# Patient Record
Sex: MALE | Employment: OTHER | URBAN - METROPOLITAN AREA
[De-identification: names, ages, dates, MRNs, and addresses within clinical notes are randomized per-mention and may not be internally consistent; named-entity substitution may affect disease eponyms.]

---

## 2018-03-08 ENCOUNTER — OFFICE VISIT (OUTPATIENT)
Dept: OPHTHALMOLOGY | Facility: CLINIC | Age: 83
End: 2018-03-08
Attending: OPHTHALMOLOGY
Payer: MEDICARE

## 2018-03-08 DIAGNOSIS — H34.8192 CRVO (CENTRAL RETINAL VEIN OCCLUSION) (H): ICD-10-CM

## 2018-03-08 DIAGNOSIS — H40.1130 PRIMARY OPEN ANGLE GLAUCOMA OF BOTH EYES, UNSPECIFIED GLAUCOMA STAGE: Primary | ICD-10-CM

## 2018-03-08 DIAGNOSIS — Z96.1 PSEUDOPHAKIA OF BOTH EYES: ICD-10-CM

## 2018-03-08 DIAGNOSIS — T81.31XA SURGICAL WOUND DEHISCENCE, INITIAL ENCOUNTER: ICD-10-CM

## 2018-03-08 DIAGNOSIS — H35.30 AMD (AGE-RELATED MACULAR DEGENERATION), BILATERAL: ICD-10-CM

## 2018-03-08 DIAGNOSIS — H40.1132 PRIMARY OPEN ANGLE GLAUCOMA OF BOTH EYES, MODERATE STAGE: ICD-10-CM

## 2018-03-08 PROCEDURE — G0463 HOSPITAL OUTPT CLINIC VISIT: HCPCS | Mod: ZF

## 2018-03-08 PROCEDURE — 92020 GONIOSCOPY: CPT | Mod: ZF | Performed by: OPHTHALMOLOGY

## 2018-03-08 PROCEDURE — 92133 CPTRZD OPH DX IMG PST SGM ON: CPT | Mod: ZF | Performed by: OPHTHALMOLOGY

## 2018-03-08 RX ORDER — CIPROFLOXACIN HYDROCHLORIDE 3.5 MG/ML
1 SOLUTION/ DROPS TOPICAL 4 TIMES DAILY
Qty: 1 BOTTLE | Refills: 0 | Status: SHIPPED | OUTPATIENT
Start: 2018-03-08

## 2018-03-08 RX ORDER — BRINZOLAMIDE 10 MG/ML
1 SUSPENSION/ DROPS OPHTHALMIC 2 TIMES DAILY
COMMUNITY
End: 2018-10-19

## 2018-03-08 RX ORDER — TAMSULOSIN HYDROCHLORIDE 0.4 MG/1
CAPSULE ORAL DAILY
COMMUNITY

## 2018-03-08 RX ORDER — TIMOLOL MALEATE 5 MG/ML
1 SOLUTION OPHTHALMIC EVERY MORNING
COMMUNITY
End: 2018-10-19

## 2018-03-08 RX ORDER — BRIMONIDINE TARTRATE 1 MG/ML
1 SOLUTION/ DROPS OPHTHALMIC 2 TIMES DAILY
COMMUNITY
End: 2018-10-19

## 2018-03-08 ASSESSMENT — GONIOSCOPY
OS_NASAL: 3+
OD_INFERIOR: 3+
OS_INFERIOR: 3+
OD_TEMPORAL: 3+
OS_TEMPORAL: 3+
OD_SUPERIOR: 3+
OS_SUPERIOR: 3+
OD_NASAL: 3+

## 2018-03-08 ASSESSMENT — PACHYMETRY
OD_CT(UM): 573
OS_CT(UM): 586

## 2018-03-08 ASSESSMENT — EXTERNAL EXAM - LEFT EYE: OS_EXAM: NORMAL

## 2018-03-08 ASSESSMENT — REFRACTION_WEARINGRX
OD_AXIS: 132
OD_SPHERE: -1.00
OD_ADD: +2.50
OS_ADD: +2.50
OS_CYLINDER: +0.00
OS_SPHERE: -0.50
OS_AXIS: 000
OD_CYLINDER: +0.50

## 2018-03-08 ASSESSMENT — VISUAL ACUITY
CORRECTION_TYPE: GLASSES
OS_CC+: -1
METHOD: SNELLEN - LINEAR
OS_CC: 20/100
OD_CC+: -1
OD_CC: 20/30

## 2018-03-08 ASSESSMENT — TONOMETRY
OS_IOP_MMHG: 21
IOP_METHOD: APPLANATION
OD_IOP_MMHG: 22

## 2018-03-08 ASSESSMENT — CONF VISUAL FIELD
OS_INFERIOR_NASAL_RESTRICTION: 3
OD_NORMAL: 1

## 2018-03-08 ASSESSMENT — SLIT LAMP EXAM - LIDS
COMMENTS: NORMAL
COMMENTS: NORMAL

## 2018-03-08 ASSESSMENT — CUP TO DISC RATIO
OD_RATIO: 0.8
OS_RATIO: 0.9

## 2018-03-08 ASSESSMENT — EXTERNAL EXAM - RIGHT EYE: OD_EXAM: NORMAL

## 2018-03-08 NOTE — MR AVS SNAPSHOT
After Visit Summary   3/8/2018    Jerome Schoenborn    MRN: [id number]           Patient Information     Date Of Birth          1935        Visit Information        Provider Department      3/8/2018 12:30 PM Lu Ray MD [facility]        Today's Diagnoses     Primary open angle glaucoma of both eyes, unspecified glaucoma stage    -  1    Primary open angle glaucoma of both eyes, moderate stage        Surgical wound dehiscence, initial encounter        Pseudophakia of both eyes        AMD (age-related macular degeneration), bilateral        CRVO (central retinal vein occlusion)          Care Instructions    Will need to obtain old notes, op notes, and visual field tests.  Patient will continue on Azopt (brinzolamide) which is an orange top drop 2x/day (12 hours apart) in  BOTH EYES, Timolol which is a yellow top drop in the morning in BOTH EYES, Alphagan (Brimonidine) which is a purple top drop 2x/day (12 hours apart) in BOTH EYES, Lumigan which is a teal top drop at bedtime in BOTH EYES.  Patient will continue on .CIprofloxacin which is a tan top 4x/day in the LEFT EYE ONLY.    A long discussion of the risks, benefits, and alternatives including potential treatment and management options were had with patient and a decision was made to proceed with tube repositioning and recovering surgery in the left eye.          Follow-ups after your visit        Your next 10 appointments already scheduled     Mar 22, 2018 12:30 PM CDT   Post-Op with Lu Ray MD   Eye Clinic ([facility])    Niraj Guy [address] [facility] [address] [place] [id number]   [phone]            Mar 29, 2018 12:45 PM CDT   Post-Op with Lu Ray MD   Eye Clinic ([facility])    Niraj Guy [address] [address]   [phone]              Who to contact     Please call your clinic at [phone]  to:    Ask questions about your health    Make or cancel appointments    Discuss your medicines    Learn about your test results    Speak to your doctor            Additional Information About Your Visit        [employer] Information     [employer] is an electronic gateway that provides easy, online access to your medical records. With [employer], you can request a clinic appointment, read your test results, renew a prescription or communicate with your care team.     To sign up for [employer] visit the website at [email or web address]   You will be asked to enter the access code listed below, as well as some personal information. Please follow the directions to create your username and password.     Your access code is: [email or web address]  Expires: 2018  6:30 AM     Your access code will  in 90 days. If you need help or a new code, please contact your [facility] or call [phone] for assistance.        Care EveryWhere ID     This is your Care EveryWhere ID. This could be used by other organizations to access your [place] medical records  [id number]         Blood Pressure from Last 3 Encounters:   No data found for BP    Weight from Last 3 Encounters:   No data found for Wt              We Performed the Following     GONIOSCOPY     OCT Optic Nerve RNFL Spectralis OU (both eyes)     Claudia-Operative Worksheet (Glaucoma)          Today's Medication Changes          These changes are accurate as of 3/8/18  2:40 PM.  If you have any questions, ask your nurse or doctor.               Start taking these medicines.        Dose/Directions    ciprofloxacin 0.3 % ophthalmic solution   Commonly known as:  CILOXAN   Used for:  Surgical wound dehiscence, initial encounter   Started by:  Lu Ray MD        Dose:  1 drop   Place 1 drop into the right eye 4 times daily   Quantity:  1 Bottle   Refills:  0            Where to get your medicines      Some of these will need a paper  prescription and others can be bought over the counter.  Ask your nurse if you have questions.     Bring a paper prescription for each of these medications     ciprofloxacin 0.3 % ophthalmic solution                Primary Care Provider Office Phone # Fax #    Brenda MART MD Elisabeth [phone]       [facility] [address]  [facility] [address]        Equal Access to Services     [facility] : Hadii aad ku hadasho Soomaali, waaxda luqadaha, qaybta kaalmada adeegyada, waxay yehuda hayaan adeirlanda chancejacobomisty lastacey . So [facility] [phone].    ATENCIÓN: Si habla español, tiene a lawson disposición servicios gratuitos de asistencia lingüística. Iggy al [phone].    We comply with applicable federal civil rights laws and [place] laws. We do not discriminate on the basis of race, color, national origin, age, disability, sex, sexual orientation, or gender identity.            Thank you!     Thank you for choosing EYE CLINIC  for your care. Our goal is always to provide you with excellent care. Hearing back from our patients is one way we can continue to improve our services. Please take a few minutes to complete the written survey that you may receive in the mail after your visit with us. Thank you!             Your Updated Medication List - Protect others around you: Learn how to safely use, store and throw away your medicines at www.disposemymeds.org.          This list is accurate as of 3/8/18  2:40 PM.  Always use your most recent med list.                   Brand Name Dispense Instructions for use Diagnosis    brimonidine 0.1 % ophthalmic solution    ALPHAGAN P     1 drop 3 times daily Uses BID BE    Primary open angle glaucoma of both eyes, unspecified glaucoma stage       brinzolamide 1 % ophthalmic susp    AZOPT     1 drop 2 times daily Uses BID BE    Primary open angle glaucoma of both eyes, unspecified glaucoma stage       ciprofloxacin 0.3 % ophthalmic solution    CILOXAN    1 Bottle     Place 1 drop into the right eye 4 times daily    Surgical wound dehiscence, initial encounter       FOLIC ACID PO      Take 1 mg by mouth daily    Primary open angle glaucoma of both eyes, unspecified glaucoma stage       METHOTREXATE PO       Primary open angle glaucoma of both eyes, unspecified glaucoma stage       tamsulosin 0.4 MG capsule    FLOMAX     Take by mouth daily    Primary open angle glaucoma of both eyes, unspecified glaucoma stage       timolol 0.5 % ophthalmic gel-form    TIMOPTIC-XE     1 drop every morning Uses BID BE    Primary open angle glaucoma of both eyes, unspecified glaucoma stage

## 2018-03-08 NOTE — PROGRESS NOTES
1)POAG -- s/p Tube revision OS in 2017 by Dr. Vizcaino, s/p Tube OS in 2013 by Dr. Vizcaino, s/p LTP OU in past per patient,  first started on gtts in 1980s  -- K pachy: 573/586   Tmax: 30s    HVF:      CDR: 0.8/0.9    HRT/OCT: OD:RNFL WNL and Mild RNFL thinning (poor tracing)      FHX of Glc: Brother, father, children on gtts     Gonio: open       Intolerant to:      Asthma/COPD: No  Steroid Use: No    Kidney Stones: No    Sulfa Allergy:  No    IOP targets:  2)PCIOL OU -- following with Dr. Vizcaino in [place], [place]  3)??H/O CRVO OS with ME s/p laser  -- following with Dr. 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 Dee  -- likely etiology of vision loss  5)Tube exposure OS -- rec surgery to repair with additional visiongaft     Will need to obtain old notes, op notes, and visual field tests.  Patient will continue on Azopt (brinzolamide) which is an orange top drop 2x/day (12 hours apart) in  BOTH EYES, Timolol which is a yellow top drop in the morning in BOTH EYES, Alphagan (Brimonidine) which is a purple top drop 2x/day (12 hours apart) in BOTH EYES, Lumigan which is a teal top drop at bedtime in BOTH EYES.  Patient will continue on .CIprofloxacin which is a tan top 4x/day in the LEFT EYE ONLY.    A long discussion of the risks, benefits, and alternatives including potential treatment and management options were had with patient and a decision was made to proceed with tube repositioning and recovering surgery in the left eye.            1. Primary open angle glaucoma (POAG) OS > OD s/p GDD (2013 Carrillo) left eye now exposed K pachy:  573/586  Tmax:  30s (normally 14-18)   HVF:      CDR:  0.4/0.8   HRT/OCT:       FHX of Glc:  Brother, Father, Children (no surgery/blindness)    Gonio:       Intolerant to:  none    Asthma/COPD:  no Steroid Use:  None current. Kidney Stones:   no  Sulfa Allergy:     no IOP targets: teens    2. Pseudophakia both eyes     3. Central retinal vein  occlusion both eyes . Receives q8 week injections by Dr Dee    4. Macular degeneration both eyes     HPI:  Reports he has been on glaucoma drops for 45 years.  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    Adam Osei MD  PGY3     Attending Physician Attestation:  Complete documentation of historical and exam elements from today's encounter can be found in the full encounter summary report (not reduplicated in this progress note). I personally obtained the chief complaint(s) and history of present illness.  I confirmed and edited as necessary the review of systems, past medical/surgical history, family history, social history, and examination findings as documented by others; and I examined the patient myself. I personally reviewed the relevant tests, images, and reports as documented above. I formulated and edited as necessary the assessment and plan and discussed the findings and management plan with the patient and family.  - Lu Ray MD

## 2018-03-08 NOTE — PATIENT INSTRUCTIONS
Will need to obtain old notes, op notes, and visual field tests.  Patient will continue on Azopt (brinzolamide) which is an orange top drop 2x/day (12 hours apart) in  BOTH EYES, Timolol which is a yellow top drop in the morning in BOTH EYES, Alphagan (Brimonidine) which is a purple top drop 2x/day (12 hours apart) in BOTH EYES, Lumigan which is a teal top drop at bedtime in BOTH EYES.  Patient will continue on .CIprofloxacin which is a tan top 4x/day in the LEFT EYE ONLY.    A long discussion of the risks, benefits, and alternatives including potential treatment and management options were had with patient and a decision was made to proceed with tube repositioning and recovering surgery in the left eye.

## 2018-03-08 NOTE — LETTER
March 8, 2018      Lance Vizcaino MD  [facility]  [address] [place] [address]  Fax: [phone]         RE: Jerome Schoenborn  [address]  [place] [address]       Dear Dr. Vizcaino:     Thank you for referring your patient, Jerome Schoenborn, to the EYE CLINIC at [facility]. Please see a copy of my visit note below.    Assessment:   1)POAG -- s/p Tube revision OS in 2017 by Dr. Vizcaino, s/p Tube OS in 2013 by Dr. Vizcaino, s/p LTP OU in past per patient,  first started on gtts in 1980s  K pachy: 573/586  Tmax: 30s  CDR: 0.8/0.9    HRT/OCT: OD:RNFL WNL and Mild RNFL thinning (poor tracing)  FHX of Glc: Brother, father, children on gtts  Gonio: open  Asthma/COPD: No  Steroid Use: No  Kidney Stones: No  Sulfa Allergy: No  2)PCIOL OU -- following with Dr. Vizcaino in [place], [place]  3)??H/O CRVO OS with ME s/p laser  -- following with Dr. 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 Dee  -- likely etiology of vision loss  5)Tube exposure OD -- rec surgery to repair with additional visiongaft       Plan:  Will need to obtain old notes, op notes, and visual field tests.  Patient will continue on Azopt (brinzolamide) which is an orange top drop 2x/day (12 hours apart) in  BOTH EYES, Timolol which is a yellow top drop in the morning in BOTH EYES, Alphagan (Brimonidine) which is a purple top drop 2x/day (12 hours apart) in BOTH EYES, Lumigan which is a teal top drop at bedtime in BOTH EYES.  Patient will continue on .Ciprofloxacin which is a tan top 4x/day in the LEFT EYE ONLY.    A long discussion of the risks, benefits, and alternatives including potential treatment and management options were had with patient and a decision was made to proceed with tube repositioning and recovering surgery in the left eye.    Tube recover left eye   continue present management with glaucoma drops  Continue antibiotic  ointment.      Again, thank you for allowing me to participate in the care of your patient.        Sincerely,      Lu Ray MD

## 2018-03-19 ENCOUNTER — ANESTHESIA EVENT (OUTPATIENT)
Dept: SURGERY | Facility: AMBULATORY SURGERY CENTER | Age: 83
End: 2018-03-19

## 2018-03-21 ENCOUNTER — ANESTHESIA (OUTPATIENT)
Dept: SURGERY | Facility: AMBULATORY SURGERY CENTER | Age: 83
End: 2018-03-21

## 2018-03-21 ENCOUNTER — HOSPITAL ENCOUNTER (OUTPATIENT)
Facility: AMBULATORY SURGERY CENTER | Age: 83
End: 2018-03-21
Attending: OPHTHALMOLOGY
Payer: COMMERCIAL

## 2018-03-21 ENCOUNTER — SURGERY (OUTPATIENT)
Age: 83
End: 2018-03-21

## 2018-03-21 VITALS
HEIGHT: 68 IN | OXYGEN SATURATION: 98 % | DIASTOLIC BLOOD PRESSURE: 77 MMHG | WEIGHT: 175 LBS | RESPIRATION RATE: 16 BRPM | BODY MASS INDEX: 26.52 KG/M2 | SYSTOLIC BLOOD PRESSURE: 135 MMHG | TEMPERATURE: 97.5 F | HEART RATE: 67 BPM

## 2018-03-21 DIAGNOSIS — H40.1132 PRIMARY OPEN ANGLE GLAUCOMA OF BOTH EYES, MODERATE STAGE: Primary | ICD-10-CM

## 2018-03-21 RX ORDER — SODIUM CHLORIDE, SODIUM LACTATE, POTASSIUM CHLORIDE, CALCIUM CHLORIDE 600; 310; 30; 20 MG/100ML; MG/100ML; MG/100ML; MG/100ML
INJECTION, SOLUTION INTRAVENOUS CONTINUOUS
Status: DISCONTINUED | OUTPATIENT
Start: 2018-03-21 | End: 2018-03-22 | Stop reason: HOSPADM

## 2018-03-21 RX ORDER — NALOXONE HYDROCHLORIDE 0.4 MG/ML
.1-.4 INJECTION, SOLUTION INTRAMUSCULAR; INTRAVENOUS; SUBCUTANEOUS
Status: DISCONTINUED | OUTPATIENT
Start: 2018-03-21 | End: 2018-03-22 | Stop reason: HOSPADM

## 2018-03-21 RX ORDER — MEPERIDINE HYDROCHLORIDE 25 MG/ML
12.5 INJECTION INTRAMUSCULAR; INTRAVENOUS; SUBCUTANEOUS
Status: DISCONTINUED | OUTPATIENT
Start: 2018-03-21 | End: 2018-03-22 | Stop reason: HOSPADM

## 2018-03-21 RX ORDER — ACETAMINOPHEN 325 MG/1
975 TABLET ORAL ONCE
Status: COMPLETED | OUTPATIENT
Start: 2018-03-21 | End: 2018-03-21

## 2018-03-21 RX ORDER — ONDANSETRON 2 MG/ML
4 INJECTION INTRAMUSCULAR; INTRAVENOUS EVERY 30 MIN PRN
Status: DISCONTINUED | OUTPATIENT
Start: 2018-03-21 | End: 2018-03-22 | Stop reason: HOSPADM

## 2018-03-21 RX ORDER — BALANCED SALT SOLUTION 6.4; .75; .48; .3; 3.9; 1.7 MG/ML; MG/ML; MG/ML; MG/ML; MG/ML; MG/ML
SOLUTION OPHTHALMIC PRN
Status: DISCONTINUED | OUTPATIENT
Start: 2018-03-21 | End: 2018-03-21 | Stop reason: HOSPADM

## 2018-03-21 RX ORDER — ONDANSETRON 4 MG/1
4 TABLET, ORALLY DISINTEGRATING ORAL EVERY 30 MIN PRN
Status: DISCONTINUED | OUTPATIENT
Start: 2018-03-21 | End: 2018-03-22 | Stop reason: HOSPADM

## 2018-03-21 RX ORDER — LIDOCAINE 40 MG/G
CREAM TOPICAL
Status: DISCONTINUED | OUTPATIENT
Start: 2018-03-21 | End: 2018-03-21 | Stop reason: HOSPADM

## 2018-03-21 RX ORDER — OXYCODONE HYDROCHLORIDE 5 MG/1
5 TABLET ORAL EVERY 4 HOURS PRN
Status: DISCONTINUED | OUTPATIENT
Start: 2018-03-21 | End: 2018-03-22 | Stop reason: HOSPADM

## 2018-03-21 RX ORDER — ONDANSETRON 2 MG/ML
INJECTION INTRAMUSCULAR; INTRAVENOUS PRN
Status: DISCONTINUED | OUTPATIENT
Start: 2018-03-21 | End: 2018-03-21

## 2018-03-21 RX ORDER — SODIUM CHLORIDE, SODIUM LACTATE, POTASSIUM CHLORIDE, CALCIUM CHLORIDE 600; 310; 30; 20 MG/100ML; MG/100ML; MG/100ML; MG/100ML
INJECTION, SOLUTION INTRAVENOUS CONTINUOUS
Status: DISCONTINUED | OUTPATIENT
Start: 2018-03-21 | End: 2018-03-21 | Stop reason: HOSPADM

## 2018-03-21 RX ORDER — DEXAMETHASONE SODIUM PHOSPHATE 4 MG/ML
INJECTION, SOLUTION INTRA-ARTICULAR; INTRALESIONAL; INTRAMUSCULAR; INTRAVENOUS; SOFT TISSUE PRN
Status: DISCONTINUED | OUTPATIENT
Start: 2018-03-21 | End: 2018-03-21 | Stop reason: HOSPADM

## 2018-03-21 RX ORDER — FENTANYL CITRATE 50 UG/ML
25-50 INJECTION, SOLUTION INTRAMUSCULAR; INTRAVENOUS
Status: DISCONTINUED | OUTPATIENT
Start: 2018-03-21 | End: 2018-03-21 | Stop reason: HOSPADM

## 2018-03-21 RX ORDER — NEOMYCIN SULFATE, POLYMYXIN B SULFATE, AND DEXAMETHASONE 3.5; 10000; 1 MG/G; [USP'U]/G; MG/G
OINTMENT OPHTHALMIC PRN
Status: DISCONTINUED | OUTPATIENT
Start: 2018-03-21 | End: 2018-03-21 | Stop reason: HOSPADM

## 2018-03-21 RX ORDER — PROPOFOL 10 MG/ML
INJECTION, EMULSION INTRAVENOUS PRN
Status: DISCONTINUED | OUTPATIENT
Start: 2018-03-21 | End: 2018-03-21

## 2018-03-21 RX ADMIN — PROPOFOL 40 MG: 10 INJECTION, EMULSION INTRAVENOUS at 10:03

## 2018-03-21 RX ADMIN — SODIUM CHLORIDE, SODIUM LACTATE, POTASSIUM CHLORIDE, CALCIUM CHLORIDE: 600; 310; 30; 20 INJECTION, SOLUTION INTRAVENOUS at 09:58

## 2018-03-21 RX ADMIN — Medication 0.5 ML: at 11:17

## 2018-03-21 RX ADMIN — ONDANSETRON 4 MG: 2 INJECTION INTRAMUSCULAR; INTRAVENOUS at 09:58

## 2018-03-21 RX ADMIN — BALANCED SALT SOLUTION 15 ML: 6.4; .75; .48; .3; 3.9; 1.7 SOLUTION OPHTHALMIC at 10:13

## 2018-03-21 RX ADMIN — ACETAMINOPHEN 975 MG: 325 TABLET ORAL at 09:21

## 2018-03-21 RX ADMIN — NEOMYCIN SULFATE, POLYMYXIN B SULFATE, AND DEXAMETHASONE 2 INCH: 3.5; 10000; 1 OINTMENT OPHTHALMIC at 11:17

## 2018-03-21 NOTE — IP AVS SNAPSHOT
MRN:[id number]                      After Visit Summary   3/21/2018    Jerome Schoenborn    MRN: [id number]           Thank you!     Thank you for choosing [facility] for your care. Our goal is always to provide you with excellent care. Hearing back from our patients is one way we can continue to improve our services. Please take a few minutes to complete the written survey that you may receive in the mail after you visit with us. Thank you!        Patient Information     Date Of Birth          1935        About your hospital stay     You were admitted on:  March 21, 2018 [facility]    You were discharged on:  March 21, 2018       Who to Call     For medical emergencies, please call 911.  For non-urgent questions about your medical care, please call your primary care provider or clinic, [phone]  For questions related to your surgery, please call your surgery clinic        Attending Provider     Provider Specialty    Lu Ray MD Ophthalmology       Primary Care Provider Office Phone # Fax #    Brenda BRITTNY Barber [phone]      After Care Instructions     Eye drops as prescribed by physician.  Start drops today unless told otherwise by the physician           May use Tylenol or Advil for pain as directed by the physician           Notify Physician if you have severe headache or nausea           Remove patch per physician instruction           Return to clinic as instructed by physician           Wear eye shield or patch as directed                 Your next 10 appointments already scheduled     Mar 22, 2018 12:30 PM CDT   Post-Op with Lu Ray MD   Eye Clinic ([facility])    [address] [facility] [address] [place] [id number]   [phone]            Mar 29, 2018 12:45 PM CDT   Post-Op with Lu Ray MD   Eye Clinic ([facility])     "[address]  9[address] Clin [address]  [facility] [id number] [phone]              Further instructions from your care team       Discharge Instructions after Eye Surgery      Take your post-operative drops according to the instructions    Wear a shield at bedtime    For tube and cataract surgery wear your shield for one week    For trabeculectomy surgery wear your shield for two weeks    Proper placement of the shield: place the bump of the shield on the bridge of your nose, resting the shield on the orbital bones. Secure the shield with one piece of tape, from forehead to cheek.      Approved activities (OK to do the following):    Please clean around the eye(s) gently with tissue or washcloth    Leaning over the sink to brush your teeth    Going up and down stairs    Walking for exercise    Watching TV or reading    After your clinic appointment tomorrow, you may shower, including putting your head under the shower, making sure to close your eyes      Restricted activities:     No bending such as \"toe-touching\"    Keep head above level of heart    Sit down to put on shoes    No heavy lifting (10 pound restriction, equal to the weight of a gallon of milk)    Do not push or rub eye      Call for any problems or questions [phone]    There is always someone on call, even on weekends.        [facility]  Home Care Following Anesthesia  For 24 hours after surgery:  1. Get plenty of rest.  A responsible adult must stay with you for at least 24 hours after you leave the surgery center.  2. Do not drive or use heavy equipment.  If you have weakness or tingling, don't drive or use heavy equipment until this feeling goes away.   3. Do not drink alcohol.   4. Avoid strenuous or risky activities.  Ask for help when climbing stairs.  5. You may feel lightheaded.  IF so, sit for a few minutes before standing.  Have someone help you get up.   6. If " you have nausea (feel sick to your stomach): Drink only clear liquids such as apple juice, ginger ale, broth or 7-Up.  Rest may also help.  Be sure to drink enough fluids.  Move to a regular diet as you feel able.   7. You may have a slight fever.  Call the doctor if your fever is over 100 F (37.7 C) (taken under the tongue) or lasts longer than 24 hours.  8. You may have a dry mouth, a sore throat, muscle aches or trouble sleeping. These should go away after 24 hours.  9. Do not make important or legal decisions.               Tips for taking pain medications  To get the best pain relief possible, remember these points:    Take pain medications as directed, before pain becomes severe.    Pain medication can upset your stomach: taking it with food may help.    Constipation is a common side effect of pain medication. Drink plenty of  fluids.    Eat foods high in fiber. Take a stool softener if recommended by your doctor or pharmacist.    Do not drink alcohol, drive or operate machinery while taking pain medications.    Ask about other ways to control pain, such as with heat, ice or relaxation.    Tylenol/Acetaminophen Consumption  To help encourage the safe use of acetaminophen, the makers of TYLENOL  have lowered the maximum daily dose for single-ingredient Extra Strength TYLENOL  (acetaminophen) products sold in the [place] from 8 pills per day (4,000 mg) to 6 pills per day (3,000 mg). The dosing interval has also changed from 2 pills every 4-6 hours to 2 pills every 6 hours.    If you feel your pain relief is insufficient, you may take Tylenol/Acetaminophen in addition to your narcotic pain medication.     Be careful not to exceed 3,000 mg of Tylenol/Acetaminophen in a 24 hour period from all sources.    If you are taking extra strength Tylenol/acetaminophen (500 mg), the maximum dose is 6 tablets in 24 hours.    If you are taking regular strength acetaminophen (325 mg), the maximum dose is 9 tablets in 24  "hours.    Call a doctor for any of the followin. Signs of infection (fever, growing tenderness at the surgery site, a large amount of drainage or bleeding, severe pain, foul-smelling drainage, redness, swelling).  2. It has been over 8 to 10 hours since surgery and you are still not able to urinate (pass water).  3. Headache for over 24 hours.  4. Numbness, tingling or weakness the day after surgery (if you had spinal anesthesia).    Your doctor is:  Dr. Franky Mosley, Ophthalmology: [phone]  Or dial [phone] and ask for the resident on call for:  Ophthalmology  For emergency care, call the:  [place] [facility]:  [phone] (TTY for hearing impaired: [phone])                    Pending Results     No orders found from 3/19/2018 to 3/22/2018.            Admission Information     Date & Time Provider Department Dept. Phone    3/21/2018 Lu Ray MD [facility] [phone]      Your Vitals Were     Blood Pressure Pulse Temperature Respirations Height Weight    146/93 67 98.5  F (36.9  C) (Oral) 16 1.727 m (5' 8\") 79.4 kg (175 lb)    Pulse Oximetry BMI (Body Mass Index)                96% 26.61 kg/m2          MyChart Information     [employer] is an electronic gateway that provides easy, online access to your medical records. With [employer], you can request a clinic appointment, read your test results, renew a prescription or communicate with your care team.     To sign up for [employer] visit the website at [email or web address]   You will be asked to enter the access code listed below, as well as some personal information. Please follow the directions to create your username and password.     Your access code is: [email or web address]  Expires: 2018  7:30 AM     Your access code will  in 90 days. If you need help or a new code, please contact your [facility] or call [phone] for assistance.        Care " EveryWhere ID     This is your Care EveryWhere ID. This could be used by other organizations to access your [facility] medical records  [id number]        Equal Access to Services     DOMONIQUE CARRASQUILLO : Chris Rogers, joycelyn de leon, magnusjudi romanodorian krystleyasmin, christopher grahamin hayaacallie daltonirlanda nascimento xena doradoRich So [facility] [phone].    ATENCIÓN: Si habla español, tiene a lawson disposición servicios gratuitos de asistencia lingüística. Llame al [phone].    We comply with applicable federal civil rights laws and [place] laws. We do not discriminate on the basis of race, color, national origin, age, disability, sex, sexual orientation, or gender identity.               Review of your medicines      CONTINUE these medicines which have NOT CHANGED        Dose / Directions    brimonidine 0.1 % ophthalmic solution   Commonly known as:  ALPHAGAN P   Used for:  Primary open angle glaucoma of both eyes, unspecified glaucoma stage        Dose:  1 drop   1 drop 3 times daily Uses BID BE   Refills:  0       brinzolamide 1 % ophthalmic susp   Commonly known as:  AZOPT   Used for:  Primary open angle glaucoma of both eyes, unspecified glaucoma stage        Dose:  1 drop   1 drop 2 times daily Uses BID BE   Refills:  0       ciprofloxacin 0.3 % ophthalmic solution   Commonly known as:  CILOXAN   Used for:  Surgical wound dehiscence, initial encounter        Dose:  1 drop   Place 1 drop into the right eye 4 times daily   Quantity:  1 Bottle   Refills:  0       FOLIC ACID PO   Used for:  Primary open angle glaucoma of both eyes, unspecified glaucoma stage        Dose:  1 mg   Take 1 mg by mouth daily   Refills:  0       HYDROCHLOROTHIAZIDE PO        Dose:  25 mg   Take 25 mg by mouth daily   Refills:  0       METHOTREXATE PO   Used for:  Primary open angle glaucoma of both eyes, unspecified glaucoma stage        Take by mouth once a week 2.5 mg tablets takes 7 tablets on sunday's   Refills:  0       tamsulosin 0.4 MG capsule    Commonly known as:  FLOMAX   Used for:  Primary open angle glaucoma of both eyes, unspecified glaucoma stage        Take by mouth daily   Refills:  0       timolol 0.5 % ophthalmic gel-form   Commonly known as:  TIMOPTIC-XE   Used for:  Primary open angle glaucoma of both eyes, unspecified glaucoma stage        Dose:  1 drop   1 drop every morning Uses BID BE   Refills:  0                Protect others around you: Learn how to safely use, store and throw away your medicines at www.disposemymeds.org.             Medication List: This is a list of all your medications and when to take them. Check marks below indicate your daily home schedule. Keep this list as a reference.      Medications           Morning Afternoon Evening Bedtime As Needed    brimonidine 0.1 % ophthalmic solution   Commonly known as:  ALPHAGAN P   1 drop 3 times daily Uses BID BE                                brinzolamide 1 % ophthalmic susp   Commonly known as:  AZOPT   1 drop 2 times daily Uses BID BE                                ciprofloxacin 0.3 % ophthalmic solution   Commonly known as:  CILOXAN   Place 1 drop into the right eye 4 times daily                                FOLIC ACID PO   Take 1 mg by mouth daily                                HYDROCHLOROTHIAZIDE PO   Take 25 mg by mouth daily                                METHOTREXATE PO   Take by mouth once a week 2.5 mg tablets takes 7 tablets on sunday's                                tamsulosin 0.4 MG capsule   Commonly known as:  FLOMAX   Take by mouth daily                                timolol 0.5 % ophthalmic gel-form   Commonly known as:  TIMOPTIC-XE   1 drop every morning Uses BID BE

## 2018-03-21 NOTE — OP NOTE
Pre-operative diagnosis: Left Eye Glaucoma with Tube Exposure   Post-operative diagnosis Same   Procedure:    Anesthesia: Procedure(s):  Tube Revision, Left  Retrobulbar Block   Surgeon: Lu Ray MD   Assistants(s): None   Estimated blood loss: Minimal                         Specimens: None   Findings:  Complications: None  None       Indication: Patient was noted to have glaucoma and previously underwent tube surgery.  Patient was noted to have tube exposure in the left eye.       After informed consent was obtained, the patient was wheeled to the operative suite. Preoperatively the patient received a retrobulbar block consisting of a 1:1 mixture of 2% Lidocaine and 0.75% Bupivicaine under propofol anesthesia. Patient was then prepped and draped in the usual sterile fashion. A lid speculum was placed between the left upper and lower eyelids. A 0.12 forceps and Vannas scissors were used to create a conjunctival peritomy extending from the 12 o'clock to 3 o'clock locations. A Dhruv scissors and nontoothed forceps were then used to dissect free the scar tissue surrounding the tube and free up the tutoplast pericardium patch graft and tube from the surrounding structures.   Two relaxing conjunctival incisions were created at the 12 o'clock and 3 o'clock locations using the Dhruv scissors and non-toothed forceps. Hemostasis was maintained with wet-field electrocautery. A 0.12 forceps and 1.0mm sideport blade were then used to make a paracentesis inferotemporally through the clear cornea. Viscoat was injected into the anterior chamber. The tube was removed from the eye using the tube inserting forceps.  The previous tunnel was closed using a 10-0 nylon interrupted suture.  The tube was checked using a tube inserting forceps and BSS on a 27 gauge cannula and it was found to be in good working order. A 23 gauge needle and 0.12 forceps were then used to make a tunnel into the anterior chamber from  approximately 1-2mm posterior to the limbus inferior to the previous tunnel.   The tube was noted to lie in good position just anterior the iris plane.   A 10-0 nylon suture was then used to adhere the tube to the episclera. The tutoplast pericardium patch graft was then repositioned and re-adhered to the episclera using two simple 10-0 nylon interrupted sutures at the leading edges of the graft.  The graft was noted to cover the tube well as well as the previous scleral tunnel.  The conjunctiva was reapproximated to the limbus using 3 simple 10-0 nylon interrupted sutures. The conjunctiva was reapproximated back to itself in the superior and temporal quadrants using a 7-0 vicryl running suture. 3 simple 10-0 nylon interrupted sutures were then used to close the previous conjunctival defect.  At the conclusion of the case the patient received subconjunctival injection of cefazolin.  The patient then had Maxitrol ophthalmic ointment applied to the ocular surface of the left eye as well as a pressure patch and meng shield.   The patient was wheeled to the recovery area in stable condition.

## 2018-03-21 NOTE — IP AVS SNAPSHOT
[facility]    [address] [place] [id number]    Phone:  [phone]    Fax:  [phone]                                       After Visit Summary   3/21/2018    Jerome Schoenborn    MRN: [id number]           After Visit Summary Signature Page     I have received my discharge instructions, and my questions have been answered. I have discussed any challenges I see with this plan with the nurse or doctor.    ..........................................................................................................................................  Patient/Patient Representative Signature      ..........................................................................................................................................  Patient Representative Print Name and Relationship to Patient    ..................................................               ................................................  Date                                            Time    ..........................................................................................................................................  Reviewed by Signature/Title    ...................................................              ..............................................  Date                                                            Time

## 2018-03-21 NOTE — ANESTHESIA POSTPROCEDURE EVALUATION
Patient: Panchito Schoenborn    Procedure(s):  Left Ahmed Tube Revision and Recovering - Wound Class: I-Clean    Diagnosis:Left Eye Exposed Tube  Diagnosis Additional Information: No value filed.    Anesthesia Type:  MAC    Note:  Anesthesia Post Evaluation    Patient location during evaluation: bedside  Patient participation: Able to fully participate in evaluation  Level of consciousness: awake  Pain management: adequate  Airway patency: patent  Cardiovascular status: acceptable  Respiratory status: acceptable  Hydration status: acceptable  PONV: controlled     Anesthetic complications: None          Last vitals:  Vitals:    03/21/18 0831   BP: (!) 146/93   Pulse: 67   Resp: 16   Temp: 36.9  C (98.5  F)   SpO2: 96%         Electronically Signed By: Janes Sena MD, MD  March 21, 2018  12:10 PM

## 2018-03-21 NOTE — ANESTHESIA PREPROCEDURE EVALUATION
Anesthesia Evaluation     . Pt has had prior anesthetic. Type: General    No history of anesthetic complications          ROS/MED HX    ENT/Pulmonary:  - neg pulmonary ROS     Neurologic:  - neg neurologic ROS     Cardiovascular:  - neg cardiovascular ROS       METS/Exercise Tolerance:  4 - Raking leaves, gardening   Hematologic:  - neg hematologic  ROS       Musculoskeletal:   (+) arthritis, , , -       GI/Hepatic:  - neg GI/hepatic ROS       Renal/Genitourinary:  - ROS Renal section negative       Endo:  - neg endo ROS       Psychiatric:  - neg psychiatric ROS       Infectious Disease:  - neg infectious disease ROS       Malignancy:         Other:                     Physical Exam      Airway   Mallampati: II  TM distance: >3 FB  Neck ROM: full    Dental   (+) missing    Cardiovascular   Rhythm and rate: regular and normal      Pulmonary    breath sounds clear to auscultation                    Anesthesia Plan      History & Physical Review  History and physical reviewed and following examination; no interval change.    ASA Status:  1 .    NPO Status:  > 6 hours    Plan for MAC with Intravenous induction. Maintenance will be TIVA.  Reason for MAC:  Deep or markedly invasive procedure (G8)  PONV prophylaxis:  Ondansetron (or other 5HT-3) and Dexamethasone or Solumedrol       Postoperative Care  Postoperative pain management:  Oral pain medications and Multi-modal analgesia.      Consents  Anesthetic plan, risks, benefits and alternatives discussed with:  Patient..                          .

## 2018-03-21 NOTE — DISCHARGE INSTRUCTIONS
"Discharge Instructions after Eye Surgery      Take your post-operative drops according to the instructions    Wear a shield at bedtime    For tube and cataract surgery wear your shield for one week    For trabeculectomy surgery wear your shield for two weeks    Proper placement of the shield: place the bump of the shield on the bridge of your nose, resting the shield on the orbital bones. Secure the shield with one piece of tape, from forehead to cheek.      Approved activities (OK to do the following):    Please clean around the eye(s) gently with tissue or washcloth    Leaning over the sink to brush your teeth    Going up and down stairs    Walking for exercise    Watching TV or reading    After your clinic appointment tomorrow, you may shower, including putting your head under the shower, making sure to close your eyes      Restricted activities:     No bending such as \"toe-touching\"    Keep head above level of heart    Sit down to put on shoes    No heavy lifting (10 pound restriction, equal to the weight of a gallon of milk)    Do not push or rub eye      Call for any problems or questions [phone]    There is always someone on call, even on weekends.        [facility]  Home Care Following Anesthesia  For 24 hours after surgery:  1. Get plenty of rest.  A responsible adult must stay with you for at least 24 hours after you leave the surgery center.  2. Do not drive or use heavy equipment.  If you have weakness or tingling, don't drive or use heavy equipment until this feeling goes away.   3. Do not drink alcohol.   4. Avoid strenuous or risky activities.  Ask for help when climbing stairs.  5. You may feel lightheaded.  IF so, sit for a few minutes before standing.  Have someone help you get up.   6. If you have nausea (feel sick to your stomach): Drink only clear liquids such as apple juice, ginger ale, broth or 7-Up.  Rest may also help.  Be sure to drink enough " fluids.  Move to a regular diet as you feel able.   7. You may have a slight fever.  Call the doctor if your fever is over 100 F (37.7 C) (taken under the tongue) or lasts longer than 24 hours.  8. You may have a dry mouth, a sore throat, muscle aches or trouble sleeping. These should go away after 24 hours.  9. Do not make important or legal decisions.               Tips for taking pain medications  To get the best pain relief possible, remember these points:    Take pain medications as directed, before pain becomes severe.    Pain medication can upset your stomach: taking it with food may help.    Constipation is a common side effect of pain medication. Drink plenty of  fluids.    Eat foods high in fiber. Take a stool softener if recommended by your doctor or pharmacist.    Do not drink alcohol, drive or operate machinery while taking pain medications.    Ask about other ways to control pain, such as with heat, ice or relaxation.    Tylenol/Acetaminophen Consumption  To help encourage the safe use of acetaminophen, the makers of TYLENOL  have lowered the maximum daily dose for single-ingredient Extra Strength TYLENOL  (acetaminophen) products sold in the [place] from 8 pills per day (4,000 mg) to 6 pills per day (3,000 mg). The dosing interval has also changed from 2 pills every 4-6 hours to 2 pills every 6 hours.    If you feel your pain relief is insufficient, you may take Tylenol/Acetaminophen in addition to your narcotic pain medication.     Be careful not to exceed 3,000 mg of Tylenol/Acetaminophen in a 24 hour period from all sources.    If you are taking extra strength Tylenol/acetaminophen (500 mg), the maximum dose is 6 tablets in 24 hours.    If you are taking regular strength acetaminophen (325 mg), the maximum dose is 9 tablets in 24 hours.    Call a doctor for any of the followin. Signs of infection (fever, growing tenderness at the surgery site, a large amount of drainage or bleeding, severe pain,  foul-smelling drainage, redness, swelling).  2. It has been over 8 to 10 hours since surgery and you are still not able to urinate (pass water).  3. Headache for over 24 hours.  4. Numbness, tingling or weakness the day after surgery (if you had spinal anesthesia).    Your doctor is:  Dr. Franky Mosley, Ophthalmology: [phone]  Or dial [phone] and ask for the resident on call for:  Ophthalmology  For emergency care, call the:  [place] [facility]:  [phone] (TTY for hearing impaired: [phone])

## 2018-03-21 NOTE — ANESTHESIA CARE TRANSFER NOTE
Patient: Panchito Schoenborn    Procedure(s):  Left Ahmed Tube Revision and Recovering - Wound Class: I-Clean    Diagnosis: Left Eye Exposed Tube  Diagnosis Additional Information: No value filed.    Anesthesia Type:   MAC     Note:  Airway :Room Air  Patient transferred to:Phase II  Comments: Patient awake and breathing spont. VSS. No complaints of pain or nausea. Report to RNHandoff Report: Identifed the Patient, Identified the Reponsible Provider, Reviewed the pertinent medical history, Discussed the surgical course, Reviewed Intra-OP anesthesia mangement and issues during anesthesia, Set expectations for post-procedure period and Allowed opportunity for questions and acknowledgement of understanding      Vitals: (Last set prior to Anesthesia Care Transfer)    CRNA VITALS  3/21/2018 1050 - 3/21/2018 1124      3/21/2018             Resp Rate (set): 10                Electronically Signed By: MATT Calloway CRNA  March 21, 2018  11:24 AM

## 2018-03-22 ENCOUNTER — OFFICE VISIT (OUTPATIENT)
Dept: OPHTHALMOLOGY | Facility: CLINIC | Age: 83
End: 2018-03-22
Attending: OPHTHALMOLOGY
Payer: MEDICARE

## 2018-03-22 DIAGNOSIS — H40.1132 PRIMARY OPEN ANGLE GLAUCOMA OF BOTH EYES, MODERATE STAGE: ICD-10-CM

## 2018-03-22 DIAGNOSIS — Z48.810 SURGICAL AFTERCARE, SENSE ORGANS: Primary | ICD-10-CM

## 2018-03-22 PROCEDURE — G0463 HOSPITAL OUTPT CLINIC VISIT: HCPCS | Mod: ZF

## 2018-03-22 RX ORDER — PREDNISOLONE ACETATE 10 MG/ML
1 SUSPENSION/ DROPS OPHTHALMIC 2 TIMES DAILY
Qty: 1 BOTTLE | Refills: 0 | Status: SHIPPED | OUTPATIENT
Start: 2018-03-22 | End: 2018-03-22

## 2018-03-22 ASSESSMENT — TONOMETRY
OD_IOP_MMHG: 17
OS_IOP_MMHG: UNAB
IOP_METHOD: APPLANATION
OS_IOP_MMHG: 1
OD_IOP_MMHG: 16
IOP_METHOD: APPLANATION

## 2018-03-22 ASSESSMENT — VISUAL ACUITY
OD_CC: 20/30
OS_CC: CF @ 3'
METHOD: SNELLEN - LINEAR
CORRECTION_TYPE: GLASSES

## 2018-03-22 ASSESSMENT — SLIT LAMP EXAM - LIDS
COMMENTS: NORMAL
COMMENTS: NORMAL

## 2018-03-22 ASSESSMENT — EXTERNAL EXAM - LEFT EYE: OS_EXAM: NORMAL

## 2018-03-22 ASSESSMENT — EXTERNAL EXAM - RIGHT EYE: OD_EXAM: NORMAL

## 2018-03-22 NOTE — PROGRESS NOTES
1)POAG -- s/p Tube revision (recovering and repositioning) OS (3/21/18), s/p Tube revision OS in 2017 by Dr. Vizcaino, s/p Tube OS in 2013 by Dr. Vizcaino, s/p LTP OU in past per patient,  first started on gtts in 1980s  -- K pachy: 573/586   Tmax: 30s    HVF:      CDR: 0.8/0.9    HRT/OCT: OD:RNFL WNL and Mild RNFL thinning (poor tracing)      FHX of Glc: Brother, father, children on gtts     Gonio: open       Intolerant to:      Asthma/COPD: No  Steroid Use: No    Kidney Stones: No    Sulfa Allergy:  No    IOP targets:  2)PCIOL OU -- following with Dr. Vizcaino in [place], [place]  3)??H/O CRVO OS with ME s/p laser  -- following with Dr. 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 Dee  -- likely etiology of vision loss    Will need to obtain old notes, op notes, and visual field tests.  Patient will continue on Azopt (brinzolamide) which is an orange top drop 2x/day (12 hours apart) in the RIGHT EYE ONLY, Timolol which is a yellow top drop in the morning in BOTH EYES, Alphagan (Brimonidine) which is a purple top drop 2x/day (12 hours apart) in the RIGHT EYE ONLY, Lumigan which is a teal top drop at bedtime in BOTH EYES.   No heavy lifting, bending, stooping, straining, rubbing the eye, or getting water in the eye.  Please wear protective eyewear over the eye at all times including glasses during the day and the protective shield at bedtime.  Patient will return to clinic in 1 week with repeat evaluation.    Use the following drops (LEFT EYE ONLY):  Antibiotic --  Ciprofloxacin: 4x/day until finished (use for at least 5-7 days after surgery)    Please be sure to call if you have any decrease in vision, increase in pain, flashing lights, redness, or a lot of drainage.                1. Primary open angle glaucoma (POAG) OS > OD s/p GDD (2013 Carrillo) left eye now exposed K pachy:  573/586  Tmax:  30s (normally 14-18)   HVF:      CDR:  0.4/0.8   HRT/OCT:       FHX of Glc:  Brother,  Father, Children (no surgery/blindness)    Gonio:       Intolerant to:  none    Asthma/COPD:  no Steroid Use:  None current. Kidney Stones:   no  Sulfa Allergy:     no IOP targets: teens    2. Pseudophakia both eyes     3. Central retinal vein occlusion both eyes . Receives q8 week injections by Dr Dee    4. Macular degeneration both eyes     HPI:  Postoperative day 1 tube revision OS. Doing well.    Ocular history  Tube revision OS 03/21/18 Flor  GDD 2013 OS Carrillo   Had reoperation about 1 year ago  Reports it loosened about 2 months ago  CEIOL both eyes 5-10 years ago    Medications  Azopt twice a day both eyes  Timolol twice a day both eyes   Lumigan at bedtime both eyes  Brimonidine twice a day  both eyes     Cipro four times a day  For one week  Pred Forte twice a day for 1 week, then daily for one week.    PLAN:  continue present management with glaucoma drops  Return in 1 weeks    Adam Osei MD  PGY3     Attending Physician Attestation:  Complete documentation of historical and exam elements from today's encounter can be found in the full encounter summary report (not reduplicated in this progress note). I personally obtained the chief complaint(s) and history of present illness.  I confirmed and edited as necessary the review of systems, past medical/surgical history, family history, social history, and examination findings as documented by others; and I examined the patient myself. I personally reviewed the relevant tests, images, and reports as documented above. I formulated and edited as necessary the assessment and plan and discussed the findings and management plan with the patient and family.  - Lu Ray MD

## 2018-03-22 NOTE — NURSING NOTE
Chief Complaints and History of Present Illnesses   Patient presents with     Post Op (Ophthalmology) Left Eye     1 day post op Left Ahmed Tube Revision     HPI    Affected eye(s):  Left   Symptoms:           Do you have eye pain now?:  Yes   Location:  OS   Pain Level:  Mild Pain (2)   Pain Duration:  1 day   Pain Frequency:  Intermittent   Other:  tender      Comments:  1 day post op Left Ahmed Tube Revision  Had OK night slept good  Very tender hurts when blinking  azopt bid BE  Prednisolone bid BE  lumigan qd BE  Oint pm MIRYAM [place] COA 1:02 PM March 22, 2018

## 2018-03-22 NOTE — PATIENT INSTRUCTIONS
Will need to obtain old notes, op notes, and visual field tests.  Patient will continue on Azopt (brinzolamide) which is an orange top drop 2x/day (12 hours apart) in the RIGHT EYE ONLY, Timolol which is a yellow top drop in the morning in BOTH EYES, Alphagan (Brimonidine) which is a purple top drop 2x/day (12 hours apart) in the RIGHT EYE ONLY, Lumigan which is a teal top drop at bedtime in BOTH EYES.   No heavy lifting, bending, stooping, straining, rubbing the eye, or getting water in the eye.  Please wear protective eyewear over the eye at all times including glasses during the day and the protective shield at bedtime.  Patient will return to clinic in 1 week with repeat evaluation.    Use the following drops (LEFT EYE ONLY):  Antibiotic --  Ciprofloxacin: 4x/day until finished (use for at least 5-7 days after surgery)    Please be sure to call if you have any decrease in vision, increase in pain, flashing lights, redness, or a lot of drainage.

## 2018-03-22 NOTE — MR AVS SNAPSHOT
After Visit Summary   3/22/2018    Jerome Schoenborn    MRN: [id number]           Patient Information     Date Of Birth          1935        Visit Information        Provider Department      3/22/2018 12:30 PM Lu Ray MD [facility]        Today's Diagnoses     Surgical aftercare, sense organs    -  1    Primary open angle glaucoma of both eyes, moderate stage          Care Instructions    Will need to obtain old notes, op notes, and visual field tests.  Patient will continue on Azopt (brinzolamide) which is an orange top drop 2x/day (12 hours apart) in the RIGHT EYE ONLY, Timolol which is a yellow top drop in the morning in BOTH EYES, Alphagan (Brimonidine) which is a purple top drop 2x/day (12 hours apart) in the RIGHT EYE ONLY, Lumigan which is a teal top drop at bedtime in BOTH EYES.   No heavy lifting, bending, stooping, straining, rubbing the eye, or getting water in the eye.  Please wear protective eyewear over the eye at all times including glasses during the day and the protective shield at bedtime.  Patient will return to clinic in 1 week with repeat evaluation.    Use the following drops (LEFT EYE ONLY):  Antibiotic --  Ciprofloxacin: 4x/day until finished (use for at least 5-7 days after surgery)    Please be sure to call if you have any decrease in vision, increase in pain, flashing lights, redness, or a lot of drainage.            Follow-ups after your visit        Follow-up notes from your care team     Return in about 1 week (around 3/29/2018).      Your next 10 appointments already scheduled     Mar 29, 2018 12:45 PM CDT   Post-Op with Lu Ray MD   Eye Clinic ([facility])    [facility] [address] [facility] [address] [place] [address]   [phone]              Who to contact     Please call your clinic at [phone] to:    Ask questions about your health    Make or cancel appointments    Discuss your  medicines    Learn about your test results    Speak to your doctor            Additional Information About Your Visit        MyChart Information     Muuthart is an electronic gateway that provides easy, online access to your medical records. With Mavin, you can request a clinic appointment, read your test results, renew a prescription or communicate with your care team.     To sign up for ScaleGridt visit the website at [email or web address]   You will be asked to enter the access code listed below, as well as some personal information. Please follow the directions to create your username and password.     Your access code is: [email or web address]  Expires: 2018  7:30 AM     Your access code will  in 90 days. If you need help or a new code, please contact your [facility] or call [phone] for assistance.        Care EveryWhere ID     This is your Care EveryWhere ID. This could be used by other organizations to access your [place] medical records  [id number]         Blood Pressure from Last 3 Encounters:   18 135/77    Weight from Last 3 Encounters:   18 79.4 kg (175 lb)              Today, you had the following     No orders found for display       Primary Care Provider Office Phone # Fax #    Brenda L MD Elisabeth [phone]       [place] [address] [place] [address]        Equal Access to Services     [facility] : Hadii aad ku hadasho Soomaali, waaxda luqadaha, qaybta kaalmada adeegyada, christopher dorado. So [facility] [phone].    ATENCIÓN: Si habla español, tiene a lawson disposición servicios gratuitos de asistencia lingüística. Iggy al [phone].    We comply with applicable federal civil rights laws and [place] laws. We do not discriminate on the basis of race, color, national origin, age, disability, sex, sexual orientation, or gender identity.            Thank you!     Thank you for  choosing EYE CLINIC  for your care. Our goal is always to provide you with excellent care. Hearing back from our patients is one way we can continue to improve our services. Please take a few minutes to complete the written survey that you may receive in the mail after your visit with us. Thank you!             Your Updated Medication List - Protect others around you: Learn how to safely use, store and throw away your medicines at www.disposemymeds.org.          This list is accurate as of 3/22/18  1:59 PM.  Always use your most recent med list.                   Brand Name Dispense Instructions for use Diagnosis    brimonidine 0.1 % ophthalmic solution    ALPHAGAN P     1 drop 3 times daily Uses BID BE    Primary open angle glaucoma of both eyes, unspecified glaucoma stage       brinzolamide 1 % ophthalmic susp    AZOPT     1 drop 2 times daily Uses BID BE    Primary open angle glaucoma of both eyes, unspecified glaucoma stage       ciprofloxacin 0.3 % ophthalmic solution    CILOXAN    1 Bottle    Place 1 drop into the right eye 4 times daily    Surgical wound dehiscence, initial encounter       FOLIC ACID PO      Take 1 mg by mouth daily    Primary open angle glaucoma of both eyes, unspecified glaucoma stage       HYDROCHLOROTHIAZIDE PO      Take 25 mg by mouth daily        METHOTREXATE PO      Take by mouth once a week 2.5 mg tablets takes 7 tablets on sunday's    Primary open angle glaucoma of both eyes, unspecified glaucoma stage       tamsulosin 0.4 MG capsule    FLOMAX     Take by mouth daily    Primary open angle glaucoma of both eyes, unspecified glaucoma stage       timolol 0.5 % ophthalmic gel-form    TIMOPTIC-XE     1 drop every morning Uses BID BE    Primary open angle glaucoma of both eyes, unspecified glaucoma stage

## 2018-03-29 ENCOUNTER — OFFICE VISIT (OUTPATIENT)
Dept: OPHTHALMOLOGY | Facility: CLINIC | Age: 83
End: 2018-03-29
Attending: OPHTHALMOLOGY
Payer: MEDICARE

## 2018-03-29 DIAGNOSIS — T81.31XA SURGICAL WOUND DEHISCENCE, INITIAL ENCOUNTER: Primary | ICD-10-CM

## 2018-03-29 PROCEDURE — G0463 HOSPITAL OUTPT CLINIC VISIT: HCPCS | Mod: ZF

## 2018-03-29 RX ORDER — LATANOPROST 50 UG/ML
1 SOLUTION/ DROPS OPHTHALMIC AT BEDTIME
COMMUNITY
End: 2018-11-02

## 2018-03-29 RX ORDER — PREDNISOLONE ACETATE 10 MG/ML
1 SUSPENSION/ DROPS OPHTHALMIC 2 TIMES DAILY
COMMUNITY

## 2018-03-29 RX ORDER — CIPROFLOXACIN HYDROCHLORIDE 3.5 MG/ML
1 SOLUTION/ DROPS TOPICAL 4 TIMES DAILY
COMMUNITY

## 2018-03-29 ASSESSMENT — REFRACTION_WEARINGRX
OD_SPHERE: -1.00
OD_AXIS: 132
OD_ADD: +2.50
OS_CYLINDER: +0.00
OS_SPHERE: -0.50
OS_AXIS: 000
OS_ADD: +2.50
OD_CYLINDER: +0.50

## 2018-03-29 ASSESSMENT — SLIT LAMP EXAM - LIDS
COMMENTS: NORMAL
COMMENTS: NORMAL

## 2018-03-29 ASSESSMENT — EXTERNAL EXAM - RIGHT EYE: OD_EXAM: NORMAL

## 2018-03-29 ASSESSMENT — EXTERNAL EXAM - LEFT EYE: OS_EXAM: NORMAL

## 2018-03-29 ASSESSMENT — VISUAL ACUITY
OD_CC: 20/40
OS_CC: 20/200
CORRECTION_TYPE: GLASSES
OD_CC+: +2
METHOD: SNELLEN - LINEAR

## 2018-03-29 ASSESSMENT — TONOMETRY
OS_IOP_MMHG: 13
IOP_METHOD: APPLANATION
OD_IOP_MMHG: 20

## 2018-03-29 NOTE — NURSING NOTE
Chief Complaints and History of Present Illnesses   Patient presents with     Post Op (Ophthalmology) Left Eye     HPI    Symptoms:           Do you have eye pain now?:  No      Comments:  POP left eye  s/p Tube revision (recovering and repositioning) OS (3/21/18).    The patient notes he had one incident of eye pain last night for two hours.  KELLY Pérez 11:57 AM 03/29/2018

## 2018-03-29 NOTE — MR AVS SNAPSHOT
After Visit Summary   3/29/2018    Jerome Schoenborn    MRN: [id number]           Patient Information     Date Of Birth          1935        Visit Information        Provider Department      3/29/2018 12:45 PM Lu Ray MD [facility]        Today's Diagnoses     Surgical wound dehiscence, initial encounter    -  1      Care Instructions    Will need to obtain old notes, op notes, and visual field tests.  Patient will continue on Azopt (brinzolamide) which is an orange top drop 2x/day (12 hours apart) in the RIGHT EYE ONLY, Timolol which is a yellow top drop in the morning in BOTH EYES, Alphagan (Brimonidine) which is a purple top drop 2x/day (12 hours apart) in the RIGHT EYE ONLY, Lumigan which is a teal top drop at bedtime in BOTH EYES.   No heavy lifting, bending, stooping, straining, rubbing the eye, or getting water in the eye.  Please wear protective eyewear over the eye at all times including glasses during the day and the protective shield at bedtime.  Patient will return to clinic in 1-2 weeks with repeat evaluation and dilated eye exam (LEFT EYE ONLY).    Use the following drops (LEFT EYE ONLY):  Antibiotic --  Ciprofloxacin: 4x/day     Steroid -- Prednisolone: 2x/day in the left eye    Please be sure to call if you have any decrease in vision, increase in pain, flashing lights, redness, or a lot of drainage.            Follow-ups after your visit        Follow-up notes from your care team     Return 1-2 weeks with repeat evaluation and dilated eye exam (LEFT EYE ONLY)..      Your next 10 appointments already scheduled     Mar 29, 2018 12:45 PM CDT   Post-Op with Lu Ray MD   Eye Clinic ([facility])    [facility]  [address] [place] [address]  [address]th Fl Clin [address]a  [facility] [address]   [phone]            Apr 06, 2018 10:15 AM CDT   RETURN GLAUCOMA with Lu Ray MD   Eye Clinic ([facility])    [facility]  [facility]  [address] Fl Clin 9a  [facility] [address]   [phone]              Who to contact     Please call your clinic at [phone] to:    Ask questions about your health    Make or cancel appointments    Discuss your medicines    Learn about your test results    Speak to your doctor            Additional Information About Your Visit        MyChart Information     iTraff Technologyt is an electronic gateway that provides easy, online access to your medical records. With Applico, you can request a clinic appointment, read your test results, renew a prescription or communicate with your care team.     To sign up for Applico visit the website at [email or web address]   You will be asked to enter the access code listed below, as well as some personal information. Please follow the directions to create your username and password.     Your access code is: [email or web address]  Expires: 2018  7:30 AM     Your access code will  in 90 days. If you need help or a new code, please contact your [facility] or call [phone] for assistance.        Care EveryWhere ID     This is your Care EveryWhere ID. This could be used by other organizations to access your [place] medical records  [id number]         Blood Pressure from Last 3 Encounters:   18 135/77    Weight from Last 3 Encounters:   18 79.4 kg (175 lb)              Today, you had the following     No orders found for display       Primary Care Provider Office Phone # Fax #    Brenda BRITTNY Barber [phone]       [facility] [address]  [place] [id number]        Equal Access to Services     SAMANTHA CARRASQUILLO AH: Hadii ena butlero Sojoel, waaxda luqadaha, qaybta kaalmada edda, christopher dorado. So [facility] [phone].    ATENCIÓN: Si habla español, tiene a lawson disposición servicios gratuitos de asistencia lingüística. Llame al [phone].    We comply with  applicable federal civil rights laws and [place] laws. We do not discriminate on the basis of race, color, national origin, age, disability, sex, sexual orientation, or gender identity.            Thank you!     Thank you for choosing EYE CLINIC  for your care. Our goal is always to provide you with excellent care. Hearing back from our patients is one way we can continue to improve our services. Please take a few minutes to complete the written survey that you may receive in the mail after your visit with us. Thank you!             Your Updated Medication List - Protect others around you: Learn how to safely use, store and throw away your medicines at www.disposemymeds.org.          This list is accurate as of 3/29/18 12:44 PM.  Always use your most recent med list.                   Brand Name Dispense Instructions for use Diagnosis    bacitracin-Polymyxin B Oint      Apply to eye At Bedtime        brimonidine 0.1 % ophthalmic solution    ALPHAGAN P     Place 1 drop into the right eye 2 times daily Uses BID BE    Primary open angle glaucoma of both eyes, unspecified glaucoma stage       brinzolamide 1 % ophthalmic susp    AZOPT     Place 1 drop into the right eye 2 times daily Uses BID BE    Primary open angle glaucoma of both eyes, unspecified glaucoma stage       * ciprofloxacin 0.3 % ophthalmic solution    CILOXAN     Place 1 drop Into the left eye 4 times daily        * ciprofloxacin 0.3 % ophthalmic solution    CILOXAN    1 Bottle    Place 1 drop into the right eye 4 times daily    Surgical wound dehiscence, initial encounter       FOLIC ACID PO      Take 1 mg by mouth daily    Primary open angle glaucoma of both eyes, unspecified glaucoma stage       HYDROCHLOROTHIAZIDE PO      Take 25 mg by mouth daily        latanoprost 0.005 % ophthalmic solution    XALATAN     Place 1 drop into both eyes At Bedtime        METHOTREXATE PO      Take by mouth once a week 2.5 mg tablets takes 7 tablets on sunday's     Primary open angle glaucoma of both eyes, unspecified glaucoma stage       prednisoLONE acetate 1 % ophthalmic susp    PRED FORTE     Place 1 drop Into the left eye 2 times daily        tamsulosin 0.4 MG capsule    FLOMAX     Take by mouth daily    Primary open angle glaucoma of both eyes, unspecified glaucoma stage       timolol 0.5 % ophthalmic gel-form    TIMOPTIC-XE     Place 1 drop into both eyes every morning Uses BID BE    Primary open angle glaucoma of both eyes, unspecified glaucoma stage       * Notice:  This list has 2 medication(s) that are the same as other medications prescribed for you. Read the directions carefully, and ask your doctor or other care provider to review them with you.

## 2018-03-29 NOTE — PATIENT INSTRUCTIONS
Will need to obtain old notes, op notes, and visual field tests.  Patient will continue on Azopt (brinzolamide) which is an orange top drop 2x/day (12 hours apart) in the RIGHT EYE ONLY, Timolol which is a yellow top drop in the morning in BOTH EYES, Alphagan (Brimonidine) which is a purple top drop 2x/day (12 hours apart) in the RIGHT EYE ONLY, Lumigan which is a teal top drop at bedtime in BOTH EYES.   No heavy lifting, bending, stooping, straining, rubbing the eye, or getting water in the eye.  Please wear protective eyewear over the eye at all times including glasses during the day and the protective shield at bedtime.  Patient will return to clinic in 1-2 weeks with repeat evaluation and dilated eye exam (LEFT EYE ONLY).    Use the following drops (LEFT EYE ONLY):  Antibiotic --  Ciprofloxacin: 4x/day     Steroid -- Prednisolone: 2x/day in the left eye    Please be sure to call if you have any decrease in vision, increase in pain, flashing lights, redness, or a lot of drainage.

## 2018-03-29 NOTE — PROGRESS NOTES
1)POAG -- s/p Tube revision (recovering and repositioning) OS (3/21/18), s/p Tube revision OS in 2017 by Dr. Vizcaino, s/p Tube OS in 2013 by Dr. Vizcaino, s/p LTP OU in past per patient,  first started on gtts in 1980s  -- K pachy: 573/586   Tmax: 30s    HVF:      CDR: 0.8/0.9    HRT/OCT: OD:RNFL WNL and Mild RNFL thinning (poor tracing)      FHX of Glc: Brother, father, children on gtts     Gonio: open       Intolerant to:      Asthma/COPD: No  Steroid Use: No    Kidney Stones: No    Sulfa Allergy:  No    IOP targets:  2)PCIOL OU -- following with Dr. Vizcaino in [place], [place]  3)??H/O CRVO OS with ME s/p laser  -- following with Dr. 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 Dee  -- likely etiology of vision loss    Will need to obtain old notes, op notes, and visual field tests.  Patient will continue on Azopt (brinzolamide) which is an orange top drop 2x/day (12 hours apart) in the RIGHT EYE ONLY, Timolol which is a yellow top drop in the morning in BOTH EYES, Alphagan (Brimonidine) which is a purple top drop 2x/day (12 hours apart) in the RIGHT EYE ONLY, Lumigan which is a teal top drop at bedtime in BOTH EYES.   No heavy lifting, bending, stooping, straining, rubbing the eye, or getting water in the eye.  Please wear protective eyewear over the eye at all times including glasses during the day and the protective shield at bedtime.  Patient will return to clinic in 1-2 weeks with repeat evaluation and dilated eye exam (LEFT EYE ONLY).    Use the following drops (LEFT EYE ONLY):  Antibiotic --  Ciprofloxacin: 4x/day     Steroid -- Prednisolone: 2x/day in the left eye    Please be sure to call if you have any decrease in vision, increase in pain, flashing lights, redness, or a lot of drainage.        Attending Physician Attestation:  Complete documentation of historical and exam elements from today's encounter can be found in the full encounter summary report (not  reduplicated in this progress note). I personally obtained the chief complaint(s) and history of present illness.  I confirmed and edited as necessary the review of systems, past medical/surgical history, family history, social history, and examination findings as documented by others; and I examined the patient myself. I personally reviewed the relevant tests, images, and reports as documented above. I formulated and edited as necessary the assessment and plan and discussed the findings and management plan with the patient and family.  - Lu Ray MD

## 2018-04-06 ENCOUNTER — OFFICE VISIT (OUTPATIENT)
Dept: OPHTHALMOLOGY | Facility: CLINIC | Age: 83
End: 2018-04-06
Attending: OPHTHALMOLOGY
Payer: MEDICARE

## 2018-04-06 ENCOUNTER — TELEPHONE (OUTPATIENT)
Dept: OPHTHALMOLOGY | Facility: CLINIC | Age: 83
End: 2018-04-06

## 2018-04-06 DIAGNOSIS — T81.31XA SURGICAL WOUND DEHISCENCE, INITIAL ENCOUNTER: ICD-10-CM

## 2018-04-06 DIAGNOSIS — H40.1130 PRIMARY OPEN ANGLE GLAUCOMA OF BOTH EYES, UNSPECIFIED GLAUCOMA STAGE: ICD-10-CM

## 2018-04-06 DIAGNOSIS — Z48.810 SURGICAL AFTERCARE, SENSE ORGANS: Primary | ICD-10-CM

## 2018-04-06 DIAGNOSIS — Z96.1 PSEUDOPHAKIA OF BOTH EYES: ICD-10-CM

## 2018-04-06 PROCEDURE — G0463 HOSPITAL OUTPT CLINIC VISIT: HCPCS | Mod: ZF

## 2018-04-06 ASSESSMENT — CONF VISUAL FIELD
METHOD: COUNTING FINGERS
OD_NORMAL: 1
OS_INFERIOR_TEMPORAL_RESTRICTION: 3

## 2018-04-06 ASSESSMENT — VISUAL ACUITY
OD_CC: 20/40
OS_CC+: -1
METHOD: SNELLEN - LINEAR
OS_CC: 20/125
CORRECTION_TYPE: GLASSES

## 2018-04-06 ASSESSMENT — REFRACTION_WEARINGRX
OS_CYLINDER: +0.00
OD_SPHERE: -1.00
OS_AXIS: 000
OS_SPHERE: -0.50
OD_AXIS: 132
OD_CYLINDER: +0.50
OS_ADD: +2.50
OD_ADD: +2.50

## 2018-04-06 ASSESSMENT — TONOMETRY
OS_IOP_MMHG: 20
IOP_METHOD: APPLANATION
OS_IOP_MMHG: 20
OS_IOP_MMHG: 23
OD_IOP_MMHG: 14
OD_IOP_MMHG: 14
IOP_METHOD: APPLANATION
OD_IOP_MMHG: 19
IOP_METHOD: TONOPEN

## 2018-04-06 ASSESSMENT — SLIT LAMP EXAM - LIDS
COMMENTS: NORMAL
COMMENTS: NORMAL

## 2018-04-06 ASSESSMENT — CUP TO DISC RATIO: OS_RATIO: 0.9

## 2018-04-06 ASSESSMENT — EXTERNAL EXAM - LEFT EYE: OS_EXAM: NORMAL

## 2018-04-06 ASSESSMENT — EXTERNAL EXAM - RIGHT EYE: OD_EXAM: NORMAL

## 2018-04-06 NOTE — PROGRESS NOTES
1)POAG -- s/p Tube revision (recovering and repositioning) OS (3/21/18), s/p Tube revision OS in 2017 by Dr. Vizcaino, s/p Tube OS in 2013 by Dr. 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 Dee  -- likely etiology of vision loss    Will need to obtain old notes, op notes, and visual field tests.  Patient will continue on Azopt (brinzolamide) which is an orange top drop 2x/day (12 hours apart) in the RIGHT EYE ONLY, Timolol which is a yellow top drop in the morning in BOTH EYES, Alphagan (Brimonidine) which is a purple top drop 2x/day (12 hours apart) in the RIGHT EYE ONLY, Lumigan which is a teal top drop at bedtime in BOTH EYES.   No heavy lifting, bending, stooping, straining, rubbing the eye, or getting water in the eye.  Please wear protective eyewear over the eye at all times including glasses during the day and the protective shield at bedtime.  Patient will return to clinic in 3-4 weeks with repeat evaluation and dilated eye exam (LEFT EYE ONLY).    Use the following drops (LEFT EYE ONLY):  Antibiotic --  Ciprofloxacin: Discontinue     Steroid -- Prednisolone: 2x/day for 1 week then 1x/day until seen    Please be sure to call if you have any decrease in vision, increase in pain, flashing lights, redness, or a lot of drainage.          Resident note:  Complains of some left eye irritation that started this morning.  Cipro four times a day  Left eye   Prednisolone twice a day left eye      Assessment/Plan:  Post-operative week 3 s/p tube revision left eye (3/21/18)  Mild choroidals, intraocular pressure 20, cris negative    Continue on Azopt (brinzolamide) which is an orange top drop 2x/day (12 hours apart) in the RIGHT EYE ONLY, Timolol which is a yellow top drop in the morning in BOTH EYES, Alphagan (Brimonidine) which is a purple top drop 2x/day (12 hours apart) in the RIGHT EYE ONLY, Lumigan which is a teal top drop at bedtime in BOTH EYES.     Stop cipro  Prednisolone twice a day left eye for one week, then daily until seen    return to clinic 3 weeks for intraocular pressure check      Adam Osei MD  PGY3     I discussed the patient with the resident and I agree with above.  Lu Ray MD

## 2018-04-06 NOTE — PATIENT INSTRUCTIONS
Patient will continue on Azopt (brinzolamide) which is an orange top drop 2x/day (12 hours apart) in the RIGHT EYE ONLY, Timolol which is a yellow top drop in the morning in BOTH EYES, Alphagan (Brimonidine) which is a purple top drop 2x/day (12 hours apart) in the RIGHT EYE ONLY, Lumigan which is a teal top drop at bedtime in BOTH EYES.   No heavy lifting, bending, stooping, straining, rubbing the eye, or getting water in the eye.  Please wear protective eyewear over the eye at all times including glasses during the day and the protective shield at bedtime.  Patient will return to clinic in 1-2 weeks with repeat evaluation and dilated eye exam (LEFT EYE ONLY).    Use the following drops (LEFT EYE ONLY):  Antibiotic --  Ciprofloxacin: stop    Steroid -- Prednisolone: 2x/day in the left eye for 1 week, then daily until seen    Please be sure to call if you have any decrease in vision, increase in pain, flashing lights, redness, or a lot of drainage.

## 2018-04-06 NOTE — MR AVS SNAPSHOT
After Visit Summary   4/6/2018    Jerome Schoenborn    MRN: [id number]           Patient Information     Date Of Birth          1935        Visit Information        Provider Department      4/6/2018 10:15 AM Adam Osei MD [facility]        Today's Diagnoses     Surgical aftercare, sense organs    -  1    Surgical wound dehiscence, initial encounter        Pseudophakia of both eyes        Primary open angle glaucoma of both eyes, unspecified glaucoma stage          Care Instructions     Patient will continue on Azopt (brinzolamide) which is an orange top drop 2x/day (12 hours apart) in the RIGHT EYE ONLY, Timolol which is a yellow top drop in the morning in BOTH EYES, Alphagan (Brimonidine) which is a purple top drop 2x/day (12 hours apart) in the RIGHT EYE ONLY, Lumigan which is a teal top drop at bedtime in BOTH EYES.   No heavy lifting, bending, stooping, straining, rubbing the eye, or getting water in the eye.  Please wear protective eyewear over the eye at all times including glasses during the day and the protective shield at bedtime.  Patient will return to clinic in 1-2 weeks with repeat evaluation and dilated eye exam (LEFT EYE ONLY).    Use the following drops (LEFT EYE ONLY):  Antibiotic --  Ciprofloxacin: stop    Steroid -- Prednisolone: 2x/day in the left eye for 1 week, then daily until seen    Please be sure to call if you have any decrease in vision, increase in pain, flashing lights, redness, or a lot of drainage.              Follow-ups after your visit        Follow-up notes from your care team     Return in about 3 weeks (around 4/27/2018) for Post op IOP check.      Your next 10 appointments already scheduled     Apr 26, 2018  2:15 PM CDT   RETURN GLAUCOMA with Lu Ray MD   Eye Clinic ([facility])    Niraj Guy [address]  9th [address] Clin [address] [place] [id number] [phone]              Who to contact     Please call your  clinic at [phone] to:    Ask questions about your health    Make or cancel appointments    Discuss your medicines    Learn about your test results    Speak to your doctor            Additional Information About Your Visit        MyChart Information     [employer] is an electronic gateway that provides easy, online access to your medical records. With [employer], you can request a clinic appointment, read your test results, renew a prescription or communicate with your care team.     To sign up for [employer] visit the website at [email or web address]   You will be asked to enter the access code listed below, as well as some personal information. Please follow the directions to create your username and password.     Your access code is: [email or web address]  Expires: 2018  7:30 AM     Your access code will  in 90 days. If you need help or a new code, please contact your [facility] or call [phone] for assistance.        Care EveryWhere ID     This is your Care EveryWhere ID. This could be used by other organizations to access your [facility] medical records  [id number]         Blood Pressure from Last 3 Encounters:   18 135/77    Weight from Last 3 Encounters:   18 79.4 kg (175 lb)              Today, you had the following     No orders found for display       Primary Care Provider Office Phone # Fax #    Brenda L MD Elisabeth [phone]       [facility] [address]  [place] [address]        Equal Access to Services     DOMONIQUE CARRASQUILLO AH: Hadii aad ku hadasho Socarmenali, waaxda luqadaha, qaybta kaalmada adeegyada, christopher dorado. So [facility] [phone].    ATENCIÓN: Si habla español, tiene a lawson disposición servicios gratuitos de asistencia lingüística. Llame al [phone].    We comply with applicable federal civil rights laws and [place] laws. We do not discriminate on the basis of race, color, national origin,  age, disability, sex, sexual orientation, or gender identity.            Thank you!     Thank you for choosing EYE CLINIC  for your care. Our goal is always to provide you with excellent care. Hearing back from our patients is one way we can continue to improve our services. Please take a few minutes to complete the written survey that you may receive in the mail after your visit with us. Thank you!             Your Updated Medication List - Protect others around you: Learn how to safely use, store and throw away your medicines at www.disposemymeds.org.          This list is accurate as of 4/6/18 11:10 AM.  Always use your most recent med list.                   Brand Name Dispense Instructions for use Diagnosis    bacitracin-Polymyxin B Oint      Apply to eye At Bedtime        brimonidine 0.1 % ophthalmic solution    ALPHAGAN P     Place 1 drop into the right eye 2 times daily Uses BID BE    Primary open angle glaucoma of both eyes, unspecified glaucoma stage       brinzolamide 1 % ophthalmic susp    AZOPT     Place 1 drop into the right eye 2 times daily Uses BID BE    Primary open angle glaucoma of both eyes, unspecified glaucoma stage       * ciprofloxacin 0.3 % ophthalmic solution    CILOXAN     Place 1 drop Into the left eye 4 times daily        * ciprofloxacin 0.3 % ophthalmic solution    CILOXAN    1 Bottle    Place 1 drop into the right eye 4 times daily    Surgical wound dehiscence, initial encounter       FOLIC ACID PO      Take 1 mg by mouth daily    Primary open angle glaucoma of both eyes, unspecified glaucoma stage       HYDROCHLOROTHIAZIDE PO      Take 25 mg by mouth daily        latanoprost 0.005 % ophthalmic solution    XALATAN     Place 1 drop into both eyes At Bedtime        METHOTREXATE PO      Take by mouth once a week 2.5 mg tablets takes 7 tablets on sunday's    Primary open angle glaucoma of both eyes, unspecified glaucoma stage       prednisoLONE acetate 1 % ophthalmic susp    PRED FORTE      Place 1 drop Into the left eye 2 times daily        tamsulosin 0.4 MG capsule    FLOMAX     Take by mouth daily    Primary open angle glaucoma of both eyes, unspecified glaucoma stage       timolol 0.5 % ophthalmic gel-form    TIMOPTIC-XE     Place 1 drop into both eyes every morning Uses BID BE    Primary open angle glaucoma of both eyes, unspecified glaucoma stage       * Notice:  This list has 2 medication(s) that are the same as other medications prescribed for you. Read the directions carefully, and ask your doctor or other care provider to review them with you.

## 2018-04-06 NOTE — NURSING NOTE
Chief Complaints and History of Present Illnesses   Patient presents with     Glaucoma Follow Up     1 week follow up      HPI    Affected eye(s):  Left   Symptoms:     No floaters   No flashes   No redness   No tearing   No Dryness         Do you have eye pain now?:  No      Comments:  Pt states vision is the same as last visit. Pt states that a stitch in LE may have come loose this morning. LE irritated and burning since this AM.    Zack WHITMORE April 6, 2018 9:49 AM

## 2018-04-12 ENCOUNTER — TELEPHONE (OUTPATIENT)
Dept: OPHTHALMOLOGY | Facility: CLINIC | Age: 83
End: 2018-04-12

## 2018-04-12 NOTE — TELEPHONE ENCOUNTER
Scheduled 4-24-18 at 0930  Left message with date/time and direct number to confirm/change    Chandler Worthington RN 11:21 AM 04/12/18    Note to facilitator

## 2018-04-12 NOTE — TELEPHONE ENCOUNTER
----- Message from Pau Lili sent at 4/12/2018  9:18 AM CDT -----  Regarding: PT CALLING BACK REGARDING MESSAGE LEFT ON 4/9 FOR UPDATE  Contact: [phone]  Pt's wife Patricia called to follow-up on previous message left on 4/9 about changing her husbands appt on 4/26 to 4/24 with Dr. Ray. I see a few openings on 4/24, but they require approval before scheduling so sending message to please give Patricia a call back.    Please give Patricia a call back at [phone].    Thank you,    [facility]  Call Center    Please DO NOT send message and or reply back to sender. Call center Representatives DO NOT respond to Messages.

## 2018-04-24 ENCOUNTER — OFFICE VISIT (OUTPATIENT)
Dept: OPHTHALMOLOGY | Facility: CLINIC | Age: 83
End: 2018-04-24
Attending: OPHTHALMOLOGY
Payer: MEDICARE

## 2018-04-24 DIAGNOSIS — H35.30 AMD (AGE-RELATED MACULAR DEGENERATION), BILATERAL: ICD-10-CM

## 2018-04-24 DIAGNOSIS — T81.31XA SURGICAL WOUND DEHISCENCE, INITIAL ENCOUNTER: Primary | ICD-10-CM

## 2018-04-24 DIAGNOSIS — H40.1130 PRIMARY OPEN ANGLE GLAUCOMA OF BOTH EYES, UNSPECIFIED GLAUCOMA STAGE: ICD-10-CM

## 2018-04-24 DIAGNOSIS — Z96.1 PSEUDOPHAKIA OF BOTH EYES: ICD-10-CM

## 2018-04-24 PROCEDURE — G0463 HOSPITAL OUTPT CLINIC VISIT: HCPCS | Mod: ZF

## 2018-04-24 ASSESSMENT — TONOMETRY
OD_IOP_MMHG: 18
OS_IOP_MMHG: 18
IOP_METHOD: APPLANATION

## 2018-04-24 ASSESSMENT — VISUAL ACUITY
OS_CC: 20/125
OD_CC+: -1
METHOD: SNELLEN - LINEAR
OS_CC+: -1
OD_CC: 20/40

## 2018-04-24 ASSESSMENT — SLIT LAMP EXAM - LIDS
COMMENTS: NORMAL
COMMENTS: NORMAL

## 2018-04-24 ASSESSMENT — EXTERNAL EXAM - LEFT EYE: OS_EXAM: NORMAL

## 2018-04-24 ASSESSMENT — EXTERNAL EXAM - RIGHT EYE: OD_EXAM: NORMAL

## 2018-04-24 NOTE — PATIENT INSTRUCTIONS
Will need to obtain old notes, op notes, and visual field tests.  Patient will continue on Azopt (brinzolamide) which is an orange top drop 2x/day (12 hours apart) in the RIGHT EYE ONLY, Timolol which is a yellow top drop in the morning in BOTH EYES, Alphagan (Brimonidine) which is a purple top drop 2x/day (12 hours apart) in the RIGHT EYE ONLY, Lumigan which is a teal top drop at bedtime in BOTH EYES.   No heavy lifting, bending, stooping, straining, rubbing the eye, or getting water in the eye.  Please wear protective eyewear over the eye at all times including glasses during the day and the protective shield at bedtime.  Patient will return to clinic in 1-2 months with repeat evaluation, visual field test (OU:[facility]).    Use the following drops (LEFT EYE ONLY):  Antibiotic --  Ciprofloxacin: Discontinue     Steroid -- Prednisolone: Discontinue    Please be sure to call if you have any decrease in vision, increase in pain, flashing lights, redness, or a lot of drainage.

## 2018-04-24 NOTE — MR AVS SNAPSHOT
After Visit Summary   4/24/2018    Jerome Schoenborn    MRN: [id number]           Patient Information     Date Of Birth          1935        Visit Information        Provider Department      4/24/2018 9:30 AM Lu Ray MD [facility]        Today's Diagnoses     Surgical wound dehiscence, initial encounter    -  1    Primary open angle glaucoma of both eyes, unspecified glaucoma stage        AMD (age-related macular degeneration), bilateral        Pseudophakia of both eyes          Care Instructions    Will need to obtain old notes, op notes, and visual field tests.  Patient will continue on Azopt (brinzolamide) which is an orange top drop 2x/day (12 hours apart) in the RIGHT EYE ONLY, Timolol which is a yellow top drop in the morning in BOTH EYES, Alphagan (Brimonidine) which is a purple top drop 2x/day (12 hours apart) in the RIGHT EYE ONLY, Lumigan which is a teal top drop at bedtime in BOTH EYES.   No heavy lifting, bending, stooping, straining, rubbing the eye, or getting water in the eye.  Please wear protective eyewear over the eye at all times including glasses during the day and the protective shield at bedtime.  Patient will return to clinic in 1-2 months with repeat evaluation, visual field test (OU:[facility]).    Use the following drops (LEFT EYE ONLY):  Antibiotic --  Ciprofloxacin: Discontinue     Steroid -- Prednisolone: Discontinue    Please be sure to call if you have any decrease in vision, increase in pain, flashing lights, redness, or a lot of drainage.            Follow-ups after your visit        Follow-up notes from your care team     Return 1-2 months with repeat evaluation, visual field test (OU:[facility])..      Your next 10 appointments already scheduled     Attila 15, 2018  9:45 AM CDT   RETURN GLAUCOMA with Lu Ray MD   Eye Clinic ([facility])    [place] [address] [address]   [phone]               Who to contact     Please call your clinic at [phone] to:    Ask questions about your health    Make or cancel appointments    Discuss your medicines    Learn about your test results    Speak to your doctor            Additional Information About Your Visit        MyChart Information     [employer] is an electronic gateway that provides easy, online access to your medical records. With [employer], you can request a clinic appointment, read your test results, renew a prescription or communicate with your care team.     To sign up for [employer] visit the website at [email or web address]   You will be asked to enter the access code listed below, as well as some personal information. Please follow the directions to create your username and password.     Your access code is: [email or web address]  Expires: 2018  7:30 AM     Your access code will  in 90 days. If you need help or a new code, please contact your [facility] or call [phone] for assistance.        Care EveryWhere ID     This is your Care EveryWhere ID. This could be used by other organizations to access your [facility] medical records  [id number]         Blood Pressure from Last 3 Encounters:   18 135/77    Weight from Last 3 Encounters:   18 79.4 kg (175 lb)              Today, you had the following     No orders found for display       Primary Care Provider Office Phone # Fax #    Brenda BRITTNY Barber [phone]       [facility] [address] [place] [place] [address]        Equal Access to Services     SAMANTHA CARRASQUILLO AH: Hadii ena butlero Sojoel, waaxda luqadaha, qaybta kaalmada murrayyada, christopher dorado. So [facility] [phone].    ATENCIÓN: Si habla español, tiene a lawson disposición servicios gratuitos de asistencia lingüística. Llame al [phone].    We comply with applicable federal civil rights laws and [place] laws. We do not discriminate  on the basis of race, color, national origin, age, disability, sex, sexual orientation, or gender identity.            Thank you!     Thank you for choosing EYE CLINIC  for your care. Our goal is always to provide you with excellent care. Hearing back from our patients is one way we can continue to improve our services. Please take a few minutes to complete the written survey that you may receive in the mail after your visit with us. Thank you!             Your Updated Medication List - Protect others around you: Learn how to safely use, store and throw away your medicines at www.disposemymeds.org.          This list is accurate as of 4/24/18 11:11 AM.  Always use your most recent med list.                   Brand Name Dispense Instructions for use Diagnosis    bacitracin-Polymyxin B Oint      Apply to eye At Bedtime        brimonidine 0.1 % ophthalmic solution    ALPHAGAN P     Place 1 drop into the right eye 2 times daily Uses BID BE    Primary open angle glaucoma of both eyes, unspecified glaucoma stage       brinzolamide 1 % ophthalmic susp    AZOPT     Place 1 drop into the right eye 2 times daily Uses BID BE    Primary open angle glaucoma of both eyes, unspecified glaucoma stage       * ciprofloxacin 0.3 % ophthalmic solution    CILOXAN     Place 1 drop Into the left eye 4 times daily        * ciprofloxacin 0.3 % ophthalmic solution    CILOXAN    1 Bottle    Place 1 drop into the right eye 4 times daily    Surgical wound dehiscence, initial encounter       FOLIC ACID PO      Take 1 mg by mouth daily    Primary open angle glaucoma of both eyes, unspecified glaucoma stage       HYDROCHLOROTHIAZIDE PO      Take 25 mg by mouth daily        latanoprost 0.005 % ophthalmic solution    XALATAN     Place 1 drop into both eyes At Bedtime        METHOTREXATE PO      Take by mouth once a week 2.5 mg tablets takes 7 tablets on sunday's    Primary open angle glaucoma of both eyes, unspecified glaucoma stage        prednisoLONE acetate 1 % ophthalmic susp    PRED FORTE     Place 1 drop Into the left eye 2 times daily        tamsulosin 0.4 MG capsule    FLOMAX     Take by mouth daily    Primary open angle glaucoma of both eyes, unspecified glaucoma stage       timolol 0.5 % ophthalmic gel-form    TIMOPTIC-XE     Place 1 drop into both eyes every morning Uses BID BE    Primary open angle glaucoma of both eyes, unspecified glaucoma stage       * Notice:  This list has 2 medication(s) that are the same as other medications prescribed for you. Read the directions carefully, and ask your doctor or other care provider to review them with you.

## 2018-04-24 NOTE — PROGRESS NOTES
1)POAG -- s/p Tube revision (recovering and repositioning) OS (3/21/18), s/p Tube revision OS in 2017 by Dr. Vizcaino, s/p Tube OS in 2013 by Dr. Vizcaino, s/p LTP OU in past per patient,  first started on gtts in 1980s  -- K pachy: 573/586   Tmax: 30s    HVF:      CDR: 0.8/0.9    HRT/OCT: OD:RNFL WNL and Mild RNFL thinning (poor tracing)      FHX of Glc: Brother, father, children on gtts     Gonio: open       Intolerant to:      Asthma/COPD: No  Steroid Use: No    Kidney Stones: No    Sulfa Allergy:  No    IOP targets:  2)PCIOL OU -- following with Dr. Vizcaino in [place], [place]  3)??H/O CRVO OS with ME s/p laser  -- following with Dr. 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 Dee  -- likely etiology of vision loss    Will need to obtain old notes, op notes, and visual field tests.  Patient will continue on Azopt (brinzolamide) which is an orange top drop 2x/day (12 hours apart) in the RIGHT EYE ONLY, Timolol which is a yellow top drop in the morning in BOTH EYES, Alphagan (Brimonidine) which is a purple top drop 2x/day (12 hours apart) in the RIGHT EYE ONLY, Lumigan which is a teal top drop at bedtime in BOTH EYES.   No heavy lifting, bending, stooping, straining, rubbing the eye, or getting water in the eye.  Please wear protective eyewear over the eye at all times including glasses during the day and the protective shield at bedtime.  Patient will return to clinic in 1-2 months with repeat evaluation, visual field test (OU:[facility]).    Use the following drops (LEFT EYE ONLY):  Antibiotic --  Ciprofloxacin: Discontinue     Steroid -- Prednisolone: Discontinue    Please be sure to call if you have any decrease in vision, increase in pain, flashing lights, redness, or a lot of drainage.          Resident note:  Complains of some left eye irritation that started this morning.  Prednisolone once a day left eye     Assessment/Plan:  Post-operative week 3 s/p tube revision left eye  (3/21/18)  Mild choroidals, intraocular pressure 18, cris negative    Continue on Azopt (brinzolamide) which is an orange top drop 2x/day (12 hours apart) in the RIGHT EYE ONLY, Timolol which is a yellow top drop in the morning in BOTH EYES, Alphagan (Brimonidine) which is a purple top drop 2x/day (12 hours apart) in the RIGHT EYE ONLY, Lumigan which is a teal top drop at bedtime in BOTH EYES.     Stop cipro  Stop Prednisolone     return to clinic 1 month    Turner Martínez MD  Ophthalmology, PGY-3    Attending Physician Attestation:  Complete documentation of historical and exam elements from today's encounter can be found in the full encounter summary report (not reduplicated in this progress note). I personally obtained the chief complaint(s) and history of present illness.  I confirmed and edited as necessary the review of systems, past medical/surgical history, family history, social history, and examination findings as documented by others; and I examined the patient myself. I personally reviewed the relevant tests, images, and reports as documented above. I formulated and edited as necessary the assessment and plan and discussed the findings and management plan with the patient and family.  - Lu Ray MD

## 2018-04-24 NOTE — NURSING NOTE
Chief Complaints and History of Present Illnesses   Patient presents with     Post Op (Ophthalmology) Left Eye     s/p Tube revision (recovering and repositioning) OS (3/21/18)     HPI    Affected eye(s):  Left   Symptoms:        Duration:  3 weeks   Frequency:  Constant       Do you have eye pain now?:  No      Comments:  Pt. States that he is doing well.  No change in VA BE.  No c/o comfort BE.  Annie Troncoso COT 9:46 AM April 24, 2018

## 2018-06-15 ENCOUNTER — OFFICE VISIT (OUTPATIENT)
Dept: OPHTHALMOLOGY | Facility: CLINIC | Age: 83
End: 2018-06-15
Attending: OPHTHALMOLOGY
Payer: MEDICARE

## 2018-06-15 DIAGNOSIS — H40.1132 PRIMARY OPEN ANGLE GLAUCOMA OF BOTH EYES, MODERATE STAGE: Primary | ICD-10-CM

## 2018-06-15 DIAGNOSIS — H34.8192 CRVO (CENTRAL RETINAL VEIN OCCLUSION) (H): ICD-10-CM

## 2018-06-15 DIAGNOSIS — H35.30 AMD (AGE-RELATED MACULAR DEGENERATION), BILATERAL: ICD-10-CM

## 2018-06-15 DIAGNOSIS — Z96.1 PSEUDOPHAKIA OF BOTH EYES: ICD-10-CM

## 2018-06-15 PROCEDURE — G0463 HOSPITAL OUTPT CLINIC VISIT: HCPCS | Mod: ZF

## 2018-06-15 PROCEDURE — 92083 EXTENDED VISUAL FIELD XM: CPT | Mod: ZF | Performed by: OPHTHALMOLOGY

## 2018-06-15 ASSESSMENT — VISUAL ACUITY
OS_CC: 20/100
OD_CC+: +2
METHOD: SNELLEN - LINEAR
CORRECTION_TYPE: GLASSES
OD_CC: 20/40

## 2018-06-15 ASSESSMENT — REFRACTION_WEARINGRX
OD_AXIS: 132
OD_SPHERE: -1.00
OS_SPHERE: -0.50
OS_AXIS: 000
OD_ADD: +2.50
OS_CYLINDER: +0.00
OS_ADD: +2.50
OD_CYLINDER: +0.50

## 2018-06-15 ASSESSMENT — CONF VISUAL FIELD
OD_NORMAL: 1
OS_NORMAL: 1

## 2018-06-15 ASSESSMENT — TONOMETRY
OS_IOP_MMHG: 16
OD_IOP_MMHG: 14
IOP_METHOD: APPLANATION

## 2018-06-15 ASSESSMENT — SLIT LAMP EXAM - LIDS
COMMENTS: NORMAL
COMMENTS: NORMAL

## 2018-06-15 ASSESSMENT — EXTERNAL EXAM - LEFT EYE: OS_EXAM: NORMAL

## 2018-06-15 ASSESSMENT — EXTERNAL EXAM - RIGHT EYE: OD_EXAM: NORMAL

## 2018-06-15 NOTE — NURSING NOTE
Chief Complaints and History of Present Illnesses   Patient presents with     Follow Up For     2 month follow up Surgical wound dehiscence,     HPI    Affected eye(s):  Both   Symptoms:     Floaters   No flashes   No glare   No halos      Duration:  2 months      Do you have eye pain now?:  No      Comments:   2 month follow   No changes  lumigan qd BE  Timolol bid BE  azopt bid BE  alphagan bid BE  Jessica Yash MENDOZA 10:12 AM Mallory 15, 2018

## 2018-06-15 NOTE — MR AVS SNAPSHOT
After Visit Summary   6/15/2018    Jerome Schoenborn    MRN: [id number]           Patient Information     Date Of Birth          1935        Visit Information        Provider Department      6/15/2018 9:45 AM [facility]        Today's Diagnoses     Primary open angle glaucoma of both eyes, moderate stage    -  1    Pseudophakia of both eyes        AMD (age-related macular degeneration), bilateral        CRVO (central retinal vein occlusion)          Care Instructions    Will need to obtain old notes, op notes, and visual field tests.  Patient will continue on Azopt (brinzolamide) which is an orange top drop 2x/day (12 hours apart) in BOTH EYES, Timolol which is a yellow top drop in the morning in BOTH EYES, Alphagan (Brimonidine) which is a purple top drop 2x/day (12 hours apart) in BOTH EYES, Lumigan which is a teal top drop at bedtime in BOTH EYES.   Patient will return to clinic in 4-6 months with dilated eye exam, visual field test (OS only:LVC) and disc photos.          Follow-ups after your visit        Follow-up notes from your care team     Return 4-6 months with dilated eye exam, visual field test (OS only:LVC) and disc photos..      Who to contact     Please call your clinic at [phone] to:    Ask questions about your health    Make or cancel appointments    Discuss your medicines    Learn about your test results    Speak to your doctor            Additional Information About Your Visit        MyChart Information     [employer] is an electronic gateway that provides easy, online access to your medical records. With [employer], you can request a clinic appointment, read your test results, renew a prescription or communicate with your care team.     To sign up for [employer] visit the website at [email or web address]   You will be asked to enter the access code listed below, as well as some personal information. Please follow the directions to create your username  and password.     Your access code is: [email or web address]  Expires: 2018  6:30 AM     Your access code will  in 90 days. If you need help or a new code, please contact your [facility] or call [phone] for assistance.        Care EveryWhere ID     This is your Care EveryWhere ID. This could be used by other organizations to access your [facility] medical records  [id number]         Blood Pressure from Last 3 Encounters:   18 135/77    Weight from Last 3 Encounters:   18 79.4 kg (175 lb)              We Performed the Following     OVF 24-2 Dynamic OU        Primary Care Provider Office Phone # Fax #    Brenda BRITTNY Barber [phone]       [facility] [address]  [place] [id number]        Equal Access to Services     DOMONIQUE CARRASQUILLO : Hadii aad ku hadasho Soomaali, waaxda luqadaha, qaybta kaalmada adeegyada, christopher blackman hayyayo mccallum . So [facility] [phone].    ATENCIÓN: Si habla español, tiene a lawson disposición servicios gratuitos de asistencia lingüística. Llame al [phone].    We comply with applicable federal civil rights laws and [place] laws. We do not discriminate on the basis of race, color, national origin, age, disability, sex, sexual orientation, or gender identity.            Thank you!     Thank you for choosing EYE CLINIC  for your care. Our goal is always to provide you with excellent care. Hearing back from our patients is one way we can continue to improve our services. Please take a few minutes to complete the written survey that you may receive in the mail after your visit with us. Thank you!             Your Updated Medication List - Protect others around you: Learn how to safely use, store and throw away your medicines at www.disposemymeds.org.          This list is accurate as of 6/15/18 11:29 AM.  Always use your most recent med list.                   Brand Name Dispense Instructions for use  Diagnosis    bacitracin-Polymyxin B Oint      Apply to eye At Bedtime        brimonidine 0.1 % ophthalmic solution    ALPHAGAN P     Place 1 drop into the right eye 2 times daily Uses BID BE    Primary open angle glaucoma of both eyes, unspecified glaucoma stage       brinzolamide 1 % ophthalmic susp    AZOPT     Place 1 drop into the right eye 2 times daily Uses BID BE    Primary open angle glaucoma of both eyes, unspecified glaucoma stage       * ciprofloxacin 0.3 % ophthalmic solution    CILOXAN     Place 1 drop Into the left eye 4 times daily        * ciprofloxacin 0.3 % ophthalmic solution    CILOXAN    1 Bottle    Place 1 drop into the right eye 4 times daily    Surgical wound dehiscence, initial encounter       FOLIC ACID PO      Take 1 mg by mouth daily    Primary open angle glaucoma of both eyes, unspecified glaucoma stage       HYDROCHLOROTHIAZIDE PO      Take 25 mg by mouth daily        latanoprost 0.005 % ophthalmic solution    XALATAN     Place 1 drop into both eyes At Bedtime        METHOTREXATE PO      Take by mouth once a week 2.5 mg tablets takes 7 tablets on sunday's    Primary open angle glaucoma of both eyes, unspecified glaucoma stage       prednisoLONE acetate 1 % ophthalmic susp    PRED FORTE     Place 1 drop Into the left eye 2 times daily        tamsulosin 0.4 MG capsule    FLOMAX     Take by mouth daily    Primary open angle glaucoma of both eyes, unspecified glaucoma stage       timolol 0.5 % ophthalmic gel-form    TIMOPTIC-XE     Place 1 drop into both eyes every morning Uses BID BE    Primary open angle glaucoma of both eyes, unspecified glaucoma stage       * Notice:  This list has 2 medication(s) that are the same as other medications prescribed for you. Read the directions carefully, and ask your doctor or other care provider to review them with you.

## 2018-06-15 NOTE — PATIENT INSTRUCTIONS
Will need to obtain old notes, op notes, and visual field tests.  Patient will continue on Azopt (brinzolamide) which is an orange top drop 2x/day (12 hours apart) in BOTH EYES, Timolol which is a yellow top drop in the morning in BOTH EYES, Alphagan (Brimonidine) which is a purple top drop 2x/day (12 hours apart) in BOTH EYES, Lumigan which is a teal top drop at bedtime in BOTH EYES.   Patient will return to clinic in 4-6 months with dilated eye exam, visual field test (OS only:LVC) and disc photos.

## 2018-06-15 NOTE — PROGRESS NOTES
1)POAG -- s/p Tube revision (recovering and repositioning) OS (3/21/18), s/p Tube revision OS in 2017 by Dr. Vizcaino, s/p Tube OS in 2013 by Dr. Vizcaino, s/p LTP OU in past per patient,  first started on gtts in 1980s  -- K pachy: 573/586   Tmax: 30s    HVF:  OD:SN dec sens vs Fluct and OS:Infarcuate    CDR: 0.8/0.9    HRT/OCT: OD:RNFL WNL and Mild RNFL thinning (poor tracing)      FHX of Glc: Brother, father, children on gtts     Gonio: open       Intolerant to:      Asthma/COPD: No  Steroid Use: No    Kidney Stones: No    Sulfa Allergy:  No    IOP targets:  2)PCIOL OU -- following with Dr. Vizcaino in [place], [place]  3)??H/O CRVO OS with ME s/p laser  -- following with Dr. 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 Dee  -- likely etiology of vision loss    Md:pt prefers to use gtts in BOTH EYES for convenience    Will need to obtain old notes, op notes, and visual field tests.  Patient will continue on Azopt (brinzolamide) which is an orange top drop 2x/day (12 hours apart) in BOTH EYES, Timolol which is a yellow top drop in the morning in BOTH EYES, Alphagan (Brimonidine) which is a purple top drop 2x/day (12 hours apart) in BOTH EYES, Lumigan which is a teal top drop at bedtime in BOTH EYES.   Patient will return to clinic in 4-6 months with dilated eye exam, visual field test (OS only:LVC) and disc photos.          Attending Physician Attestation:  Complete documentation of historical and exam elements from today's encounter can be found in the full encounter summary report (not reduplicated in this progress note). I personally obtained the chief complaint(s) and history of present illness.  I confirmed and edited as necessary the review of systems, past medical/surgical history, family history, social history, and examination findings as documented by others; and I examined the patient myself. I personally reviewed the relevant tests, images, and reports as documented  above. I formulated and edited as necessary the assessment and plan and discussed the findings and management plan with the patient and family.  - Lu Ray MD

## 2018-08-22 ENCOUNTER — TRANSFERRED RECORDS (OUTPATIENT)
Dept: HEALTH INFORMATION MANAGEMENT | Facility: CLINIC | Age: 83
End: 2018-08-22

## 2018-10-19 ENCOUNTER — OFFICE VISIT (OUTPATIENT)
Dept: OPHTHALMOLOGY | Facility: CLINIC | Age: 83
End: 2018-10-19
Attending: OPHTHALMOLOGY
Payer: MEDICARE

## 2018-10-19 DIAGNOSIS — H40.1132 PRIMARY OPEN ANGLE GLAUCOMA OF BOTH EYES, MODERATE STAGE: Primary | ICD-10-CM

## 2018-10-19 DIAGNOSIS — H40.1190 POAG (PRIMARY OPEN-ANGLE GLAUCOMA): Primary | ICD-10-CM

## 2018-10-19 DIAGNOSIS — H40.1130 PRIMARY OPEN ANGLE GLAUCOMA OF BOTH EYES, UNSPECIFIED GLAUCOMA STAGE: ICD-10-CM

## 2018-10-19 PROCEDURE — G0463 HOSPITAL OUTPT CLINIC VISIT: HCPCS | Mod: ZF

## 2018-10-19 PROCEDURE — 92083 EXTENDED VISUAL FIELD XM: CPT | Mod: ZF | Performed by: OPHTHALMOLOGY

## 2018-10-19 RX ORDER — BRINZOLAMIDE 10 MG/ML
1 SUSPENSION/ DROPS OPHTHALMIC 2 TIMES DAILY
Qty: 1 BOTTLE | Refills: 11 | Status: SHIPPED | OUTPATIENT
Start: 2018-10-19 | End: 2018-11-06

## 2018-10-19 RX ORDER — TIMOLOL MALEATE 5 MG/ML
1 SOLUTION OPHTHALMIC EVERY MORNING
Qty: 1 BOTTLE | Refills: 11 | Status: SHIPPED | OUTPATIENT
Start: 2018-10-19

## 2018-10-19 RX ORDER — BRIMONIDINE TARTRATE 1 MG/ML
1 SOLUTION/ DROPS OPHTHALMIC 2 TIMES DAILY
Qty: 1 BOTTLE | Refills: 11 | Status: SHIPPED | OUTPATIENT
Start: 2018-10-19 | End: 2018-11-01 | Stop reason: DRUGHIGH

## 2018-10-19 ASSESSMENT — VISUAL ACUITY
OD_CC+: +3
METHOD: SNELLEN - LINEAR
OS_CC: 20/100
OD_CC: 20/40
CORRECTION_TYPE: GLASSES

## 2018-10-19 ASSESSMENT — REFRACTION_WEARINGRX
OD_ADD: +2.50
OD_AXIS: 132
OD_CYLINDER: +0.50
OS_CYLINDER: +0.00
OS_AXIS: 000
OD_SPHERE: -1.00
OS_SPHERE: -0.50
OS_ADD: +2.50

## 2018-10-19 ASSESSMENT — CONF VISUAL FIELD
OD_NORMAL: 1
OS_INFERIOR_NASAL_RESTRICTION: 3

## 2018-10-19 ASSESSMENT — TONOMETRY
IOP_METHOD: APPLANATION
OS_IOP_MMHG: 17
OD_IOP_MMHG: 17

## 2018-10-19 ASSESSMENT — SLIT LAMP EXAM - LIDS
COMMENTS: NORMAL
COMMENTS: NORMAL

## 2018-10-19 ASSESSMENT — CUP TO DISC RATIO
OD_RATIO: 0.8
OS_RATIO: 0.9

## 2018-10-19 ASSESSMENT — EXTERNAL EXAM - RIGHT EYE: OD_EXAM: NORMAL

## 2018-10-19 ASSESSMENT — EXTERNAL EXAM - LEFT EYE: OS_EXAM: NORMAL

## 2018-10-19 NOTE — NURSING NOTE
Chief Complaints and History of Present Illnesses   Patient presents with     Follow Up For     POAG (primary open-angle glaucoma) (Primary Dx)     HPI    Affected eye(s):  Both   Symptoms:     No blurred vision   No decreased vision   No floaters   No flashes      Duration:  4 months   Frequency:  Constant       Do you have eye pain now?:  No      Comments:  States va is the same since last visit   Azopt  BOTH EYES last used 7:00 PM  Timolol BOTH EYES last used 10:00 AM   Alphagan BOTH EYES last used  6:30 AM  Lumigan  BOTH EYES last used 12:00 AM  Pillo Kaufman  10:26 AM October 19, 2018

## 2018-10-19 NOTE — PROGRESS NOTES
1)POAG -- s/p Tube revision (recovering and repositioning) OS (3/21/18), s/p Tube revision OS in 2017 by Dr. Vizcaino, s/p Tube OS in 2013 by Dr. Vizcaino, s/p LTP OU in past per patient,  first started on gtts in 1980s  -- K pachy: 573/586   Tmax: 30s    HVF:  OD:SN dec sens vs Fluct and OS:Infarcuate    CDR: 0.8/0.9    HRT/OCT: OD:RNFL WNL and Mild RNFL thinning (poor tracing)      FHX of Glc: Brother, father, children on gtts     Gonio: open       Intolerant to:      Asthma/COPD: No  Steroid Use: No    Kidney Stones: No    Sulfa Allergy:  No    IOP targets:  2)PCIOL OU -- following with Dr. Vizcaino in [place], [place]  3)??H/O CRVO OS with ME s/p laser  -- following with Dr. 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 Dee  -- likely etiology of vision loss    Md:pt prefers to use gtts in BOTH EYES for convenience    Will need to obtain old notes, op notes, and visual field tests.  Patient will continue on Azopt (brinzolamide) which is an orange top drop 2x/day (12 hours apart) in BOTH EYES, Timolol which is a yellow top drop in the morning in BOTH EYES, Alphagan (Brimonidine) which is a purple top drop 2x/day (12 hours apart) in BOTH EYES, Lumigan which is a teal top drop at bedtime in BOTH EYES.   Patient will return to clinic in 6-8 months with visual field test and OCT with RNFL analysis.    Resident Note (Please use final note above)  LVC left eye stable today with inf arc  IOP good  CPM     Return 3-4 months IOP check, undilated, OCT rNFL OU    Franklin Harris M.D.  PGY-3, Ophthalmology      Attending Physician Attestation:  Complete documentation of historical and exam elements from today's encounter can be found in the full encounter summary report (not reduplicated in this progress note). I personally obtained the chief complaint(s) and history of present illness.  I confirmed and edited as necessary the review of systems, past medical/surgical history, family history,  social history, and examination findings as documented by others; and I examined the patient myself. I personally reviewed the relevant tests, images, and reports as documented above. I formulated and edited as necessary the assessment and plan and discussed the findings and management plan with the patient and family.  - Lu Ray MD

## 2018-10-19 NOTE — PATIENT INSTRUCTIONS
Will need to obtain old notes, op notes, and visual field tests.  Patient will continue on Azopt (brinzolamide) which is an orange top drop 2x/day (12 hours apart) in BOTH EYES, Timolol which is a yellow top drop in the morning in BOTH EYES, Alphagan (Brimonidine) which is a purple top drop 2x/day (12 hours apart) in BOTH EYES, Lumigan which is a teal top drop at bedtime in BOTH EYES.  Patient will return to clinic in 6-8 months with visual field test and OCT with RNFL analysis.

## 2018-10-19 NOTE — MR AVS SNAPSHOT
After Visit Summary   10/19/2018    Jerome Schoenborn    MRN: [id number]           Patient Information     Date Of Birth          1935        Visit Information        Provider Department      10/19/2018 9:30 AM [facility]        Today's Diagnoses     Primary open angle glaucoma of both eyes, moderate stage - Both Eyes    -  1    Primary open angle glaucoma of both eyes, unspecified glaucoma stage          Care Instructions    Will need to obtain old notes, op notes, and visual field tests.  Patient will continue on Azopt (brinzolamide) which is an orange top drop 2x/day (12 hours apart) in BOTH EYES, Timolol which is a yellow top drop in the morning in BOTH EYES, Alphagan (Brimonidine) which is a purple top drop 2x/day (12 hours apart) in BOTH EYES, Lumigan which is a teal top drop at bedtime in BOTH EYES.  Patient will return to clinic in 6-8 months with visual field test and OCT with RNFL analysis.            Follow-ups after your visit        Follow-up notes from your care team     Return 6-8 months with visual field test and OCT with RNFL analysis..      Your next 10 appointments already scheduled     May 21, 2019 12:30 PM CDT   RETURN GLAUCOMA with Lu Ray MD   Eye Clinic ([facility])    [address] [address]   [phone]              Future tests that were ordered for you today     Open Future Orders        Priority Expected Expires Ordered    Low Vision Central OS Routine  10/19/2019 10/19/2018    Fundus Photos OU (both eyes) Routine  4/17/2020 10/19/2018    DILATED FUNDUS EXAM Routine  10/19/2019 10/19/2018            Who to contact     Please call your clinic at [phone] to:    Ask questions about your health    Make or cancel appointments    Discuss your medicines    Learn about your test results    Speak to your doctor            Additional Information About Your Visit         [employer] Information     [employer] is an electronic gateway that provides easy, online access to your medical records. With [employer], you can request a clinic appointment, read your test results, renew a prescription or communicate with your care team.     To sign up for [employer] visit the website at [email or web address]   You will be asked to enter the access code listed below, as well as some personal information. Please follow the directions to create your username and password.     Your access code is: [email or web address]  Expires: 1/3/2019  6:31 AM     Your access code will  in 90 days. If you need help or a new code, please contact your [facility] or call [phone] for assistance.        Care EveryWhere ID     This is your Care EveryWhere ID. This could be used by other organizations to access your [facility] medical records  [id number]         Blood Pressure from Last 3 Encounters:   18 135/77    Weight from Last 3 Encounters:   18 79.4 kg (175 lb)              We Performed the Following     OVF 24-2 Dynamic OS          Today's Medication Changes          These changes are accurate as of 10/19/18 12:13 PM.  If you have any questions, ask your nurse or doctor.               Start taking these medicines.        Dose/Directions    bimatoprost 0.01 % Soln   Commonly known as:  LUMIGAN   Used for:  Primary open angle glaucoma of both eyes, moderate stage   Started by:  Lu Ray MD        Dose:  1 drop   Place 1 drop into both eyes At Bedtime   Quantity:  1 Bottle   Refills:  11            Where to get your medicines      Some of these will need a paper prescription and others can be bought over the counter.  Ask your nurse if you have questions.     Bring a paper prescription for each of these medications     bimatoprost 0.01 % Soln    brimonidine 0.1 % ophthalmic solution    brinzolamide 1 % ophthalmic susp    timolol 0.5 % ophthalmic gel-form                 Primary Care Provider Office Phone # Fax #    Brenda BRITTNY Barber [phone]       [facility] [address]  [place] [address]        Equal Access to Services     DOMONIQUE CARRASQUILLO : Hadii ena fishman lukeo Sojoel, waanuradhada luqadaha, qaybta kaalmada edda, christopher nascimento laLiangyayo dorado. So [facility] [phone].    ATENCIÓN: Si habla español, tiene a lawson disposición servicios gratuitos de asistencia lingüística. Levarame al [phone].    We comply with applicable federal civil rights laws and [place] laws. We do not discriminate on the basis of race, color, national origin, age, disability, sex, sexual orientation, or gender identity.            Thank you!     Thank you for choosing [facility]YE CLINIC  for your care. Our goal is always to provide you with excellent care. Hearing back from our patients is one way we can continue to improve our services. Please take a few minutes to complete the written survey that you may receive in the mail after your visit with us. Thank you!             Your Updated Medication List - Protect others around you: Learn how to safely use, store and throw away your medicines at www.disposemymeds.org.          This list is accurate as of 10/19/18 12:13 PM.  Always use your most recent med list.                   Brand Name Dispense Instructions for use Diagnosis    bacitracin-Polymyxin B Oint      Apply to eye At Bedtime        bimatoprost 0.01 % Soln    LUMIGAN    1 Bottle    Place 1 drop into both eyes At Bedtime    Primary open angle glaucoma of both eyes, moderate stage       brimonidine 0.1 % ophthalmic solution    ALPHAGAN P    1 Bottle    Place 1 drop into the right eye 2 times daily Uses BID BE    Primary open angle glaucoma of both eyes, unspecified glaucoma stage       brinzolamide 1 % ophthalmic susp    AZOPT    1 Bottle    Place 1 drop into the right eye 2 times daily Uses BID BE    Primary open angle glaucoma of both eyes, unspecified  glaucoma stage       * ciprofloxacin 0.3 % ophthalmic solution    CILOXAN     Place 1 drop Into the left eye 4 times daily        * ciprofloxacin 0.3 % ophthalmic solution    CILOXAN    1 Bottle    Place 1 drop into the right eye 4 times daily    Surgical wound dehiscence, initial encounter       FOLIC ACID PO      Take 1 mg by mouth daily    Primary open angle glaucoma of both eyes, unspecified glaucoma stage       HYDROCHLOROTHIAZIDE PO      Take 25 mg by mouth daily        latanoprost 0.005 % ophthalmic solution    XALATAN     Place 1 drop into both eyes At Bedtime        METHOTREXATE PO      Take by mouth once a week 2.5 mg tablets takes 7 tablets on sunday's    Primary open angle glaucoma of both eyes, unspecified glaucoma stage       prednisoLONE acetate 1 % ophthalmic susp    PRED FORTE     Place 1 drop Into the left eye 2 times daily        tamsulosin 0.4 MG capsule    FLOMAX     Take by mouth daily    Primary open angle glaucoma of both eyes, unspecified glaucoma stage       timolol 0.5 % ophthalmic gel-form    TIMOPTIC-XE    1 Bottle    Place 1 drop into both eyes every morning Uses BID BE    Primary open angle glaucoma of both eyes, unspecified glaucoma stage       * Notice:  This list has 2 medication(s) that are the same as other medications prescribed for you. Read the directions carefully, and ask your doctor or other care provider to review them with you.

## 2018-11-01 ENCOUNTER — TELEPHONE (OUTPATIENT)
Dept: OPHTHALMOLOGY | Facility: CLINIC | Age: 83
End: 2018-11-01

## 2018-11-01 DIAGNOSIS — H40.1133 PRIMARY OPEN ANGLE GLAUCOMA (POAG) OF BOTH EYES, SEVERE STAGE: Primary | ICD-10-CM

## 2018-11-01 RX ORDER — LATANOPROST 50 UG/ML
1 SOLUTION/ DROPS OPHTHALMIC AT BEDTIME
Qty: 1 BOTTLE | Refills: 11 | Status: SHIPPED | OUTPATIENT
Start: 2018-11-01 | End: 2018-11-02

## 2018-11-01 RX ORDER — BRIMONIDINE TARTRATE 2 MG/ML
1 SOLUTION/ DROPS OPHTHALMIC 3 TIMES DAILY
Qty: 1 BOTTLE | Refills: 11 | Status: SHIPPED | OUTPATIENT
Start: 2018-11-01 | End: 2018-11-02

## 2018-11-02 RX ORDER — LATANOPROST 50 UG/ML
1 SOLUTION/ DROPS OPHTHALMIC AT BEDTIME
Qty: 1 BOTTLE | Refills: 11 | Status: SHIPPED | OUTPATIENT
Start: 2018-11-02

## 2018-11-02 RX ORDER — BRIMONIDINE TARTRATE 2 MG/ML
1 SOLUTION/ DROPS OPHTHALMIC 2 TIMES DAILY
Qty: 1 BOTTLE | Refills: 11 | Status: SHIPPED | OUTPATIENT
Start: 2018-11-02

## 2018-11-05 ENCOUNTER — TELEPHONE (OUTPATIENT)
Dept: OPHTHALMOLOGY | Facility: CLINIC | Age: 83
End: 2018-11-05

## 2018-11-05 DIAGNOSIS — H40.1133 PRIMARY OPEN ANGLE GLAUCOMA (POAG) OF BOTH EYES, SEVERE STAGE: ICD-10-CM

## 2018-11-05 NOTE — TELEPHONE ENCOUNTER
[facility] Call Center    Phone Message    May a detailed message be left on voicemail: yes    Reason for Call: Other: per Emma, nurse from [facility]- wanting to speak to a nurse about medications of  brinzolamide (AZOPT) 1 % ophthalmic susp and Bimatoprost, not being covered, please give Emma a call back. thank you !     Action Taken: Message routed to:  [facility]

## 2018-11-06 RX ORDER — DORZOLAMIDE HCL 20 MG/ML
1 SOLUTION/ DROPS OPHTHALMIC 2 TIMES DAILY
Qty: 1 BOTTLE | Refills: 11
Start: 2018-11-06

## 2018-11-06 NOTE — TELEPHONE ENCOUNTER
Pharmacy has dorzolamide on file and ok to use ([facility] formulary) instead of azopt    Pharmacy has updated latanoprost, updated 2% brimonidine, timolol, and now dorzolamide    Pharmacy will notify nursing at hospital  Chandler Worthington RN 9:01 AM 11/06/18    Note to dr. delmy Worthington RN 9:01 AM 11/06/18

## 2019-05-21 ENCOUNTER — OFFICE VISIT (OUTPATIENT)
Dept: OPHTHALMOLOGY | Facility: CLINIC | Age: 84
End: 2019-05-21
Attending: OPHTHALMOLOGY
Payer: COMMERCIAL

## 2019-05-21 DIAGNOSIS — H40.1133 PRIMARY OPEN ANGLE GLAUCOMA (POAG) OF BOTH EYES, SEVERE STAGE: Primary | ICD-10-CM

## 2019-05-21 DIAGNOSIS — H40.1132 PRIMARY OPEN ANGLE GLAUCOMA OF BOTH EYES, MODERATE STAGE: ICD-10-CM

## 2019-05-21 DIAGNOSIS — H35.30 AMD (AGE-RELATED MACULAR DEGENERATION), BILATERAL: ICD-10-CM

## 2019-05-21 DIAGNOSIS — Z96.1 PSEUDOPHAKIA OF BOTH EYES: ICD-10-CM

## 2019-05-21 PROCEDURE — 92083 EXTENDED VISUAL FIELD XM: CPT | Mod: ZF | Performed by: OPHTHALMOLOGY

## 2019-05-21 PROCEDURE — G0463 HOSPITAL OUTPT CLINIC VISIT: HCPCS | Mod: ZF

## 2019-05-21 PROCEDURE — 92015 DETERMINE REFRACTIVE STATE: CPT | Mod: ZF

## 2019-05-21 PROCEDURE — 92133 CPTRZD OPH DX IMG PST SGM ON: CPT | Mod: ZF | Performed by: OPHTHALMOLOGY

## 2019-05-21 ASSESSMENT — VISUAL ACUITY
OS_CC+: +1
OD_CC+: -2
OD_CC: 20/40
CORRECTION_TYPE: GLASSES
OS_CC: 20/100
METHOD: SNELLEN - LINEAR

## 2019-05-21 ASSESSMENT — REFRACTION_MANIFEST
OD_SPHERE: +1.75
OD_CYLINDER: +0.50
OS_SPHERE: +2.75
OD_ADD: +3.50
OD_AXIS: 165
OS_SPHERE: -0.75
OS_ADD: +3.50
OD_AXIS: 165
OD_SPHERE: -1.75
OD_CYLINDER: +0.50

## 2019-05-21 ASSESSMENT — REFRACTION_WEARINGRX
OD_ADD: +2.50
OS_AXIS: 000
OS_CYLINDER: +0.00
OD_AXIS: 140
OS_SPHERE: -0.75
OS_ADD: +2.50
OD_SPHERE: -1.00
OD_CYLINDER: +0.25

## 2019-05-21 ASSESSMENT — EXTERNAL EXAM - RIGHT EYE: OD_EXAM: NORMAL

## 2019-05-21 ASSESSMENT — SLIT LAMP EXAM - LIDS
COMMENTS: NORMAL
COMMENTS: NORMAL

## 2019-05-21 ASSESSMENT — CONF VISUAL FIELD
OS_INFERIOR_NASAL_RESTRICTION: 3
OD_NORMAL: 1
METHOD: COUNTING FINGERS

## 2019-05-21 ASSESSMENT — EXTERNAL EXAM - LEFT EYE: OS_EXAM: NORMAL

## 2019-05-21 ASSESSMENT — TONOMETRY
IOP_METHOD: APPLANATION
OD_IOP_MMHG: 14
OS_IOP_MMHG: 16

## 2019-05-21 NOTE — PROGRESS NOTES
1)POAG -- s/p Tube revision (recovering and repositioning) OS (3/21/18), s/p Tube revision OS in 2017 by Dr. Vizcaino, s/p Tube OS in 2013 by Dr. Vizcaino, s/p LTP OU in past per patient,  first started on gtts in 1980s  -- K pachy: 573/586   Tmax: 30s    HVF:  OD:SN dec sens vs Fluct and OS:Infarcuate    CDR: 0.8/0.9    HRT/OCT: OD:RNFL WNL and Mild RNFL thinning (poor tracing)      FHX of Glc: Brother, father, children on gtts     Gonio: open       Intolerant to:      Asthma/COPD: No  Steroid Use: No    Kidney Stones: No    Sulfa Allergy:  No    IOP targets:  2)PCIOL OU -- following with Dr. Vizcaino in [place], [place]  3)??H/O CRVO OS with ME s/p laser  -- following with Dr. 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 Dee  -- likely etiology of vision loss    Md:pt prefers to use gtts in BOTH EYES for convenience    Will need to obtain old notes, op notes, and visual field tests.  Patient will continue on Azopt (brinzolamide) which is an orange top drop 2x/day (12 hours apart) in BOTH EYES, Timolol which is a yellow top drop in the morning in BOTH EYES, Alphagan (Brimonidine) which is a purple top drop 2x/day (12 hours apart) in BOTH EYES, Lumigan which is a teal top drop at bedtime in BOTH EYES.   Patient will return to clinic in 4-6 months with visual field test (OD:24-2 and OS:LVC) and dilated eye exam.  Patient will also follow up with Dr. Dee.       Attending Physician Attestation:  Complete documentation of historical and exam elements from today's encounter can be found in the full encounter summary report (not reduplicated in this progress note). I personally obtained the chief complaint(s) and history of present illness.  I confirmed and edited as necessary the review of systems, past medical/surgical history, family history, social history, and examination findings as documented by others; and I examined the patient myself. I personally reviewed the relevant tests,  images, and reports as documented above. I formulated and edited as necessary the assessment and plan and discussed the findings and management plan with the patient and family.  - Lu Ray MD

## 2019-05-21 NOTE — PATIENT INSTRUCTIONS
Patient will continue on Azopt (brinzolamide) which is an orange top drop 2x/day (12 hours apart) in BOTH EYES, Timolol which is a yellow top drop in the morning in BOTH EYES, Alphagan (Brimonidine) which is a purple top drop 2x/day (12 hours apart) in BOTH EYES, Lumigan which is a teal top drop at bedtime in BOTH EYES.   Patient will return to clinic in 4-6 months with visual field test (OD:24-2 and OS:LVC) and dilated eye exam.  Patient will also follow up with Dr. Dee.

## 2019-05-21 NOTE — NURSING NOTE
Chief Complaints and History of Present Illnesses   Patient presents with     Glaucoma Follow-Up     Chief Complaint(s) and History of Present Illness(es)     Glaucoma Follow-Up               Comments     Glaucoma follow up.    The patient notes that he sees a dark floater in the left eye.  The patient is wondering about the suture that is in his left eye after surgery.  The patient is scheduled for back surgery next week.  KELLY Pérez 12:56 PM 05/21/2019

## 2019-05-22 ENCOUNTER — TELEPHONE (OUTPATIENT)
Dept: OPHTHALMOLOGY | Facility: CLINIC | Age: 84
End: 2019-05-22

## 2019-05-22 NOTE — TELEPHONE ENCOUNTER
Called at 1200  Pt will shred other pt's glasses Rx-- has shredder at home  Faxed pt's last glasses Rx to pt's home fax number     Will review with supervisor. Contact other pt and make act report  Chandler Worthington RN 12:10 PM 05/22/19        [facility] Call Center    Phone Message    May a detailed message be left on voicemail: yes    Reason for Call: Other: the pt's wife called - when the pt left the appt yesterday, they were given the glasses RX for Crhisty Meek, not Panchito. Patricia is asking for the pt's current RX be faxed to them at [phone]. Please call Patricia with any questions. Thanks.    Action Taken: Message routed to:  [facility]

## 2019-09-18 ENCOUNTER — TRANSFERRED RECORDS (OUTPATIENT)
Dept: HEALTH INFORMATION MANAGEMENT | Facility: CLINIC | Age: 84
End: 2019-09-18

## 2019-11-21 ENCOUNTER — OFFICE VISIT (OUTPATIENT)
Dept: OPHTHALMOLOGY | Facility: CLINIC | Age: 84
End: 2019-11-21
Attending: OPHTHALMOLOGY
Payer: COMMERCIAL

## 2019-11-21 DIAGNOSIS — H35.30 AMD (AGE-RELATED MACULAR DEGENERATION), BILATERAL: ICD-10-CM

## 2019-11-21 DIAGNOSIS — H40.1133 PRIMARY OPEN ANGLE GLAUCOMA (POAG) OF BOTH EYES, SEVERE STAGE: Primary | ICD-10-CM

## 2019-11-21 DIAGNOSIS — Z96.1 PSEUDOPHAKIA OF BOTH EYES: ICD-10-CM

## 2019-11-21 PROCEDURE — 92083 EXTENDED VISUAL FIELD XM: CPT | Mod: ZF | Performed by: OPHTHALMOLOGY

## 2019-11-21 PROCEDURE — 92250 FUNDUS PHOTOGRAPHY W/I&R: CPT | Mod: ZF | Performed by: OPHTHALMOLOGY

## 2019-11-21 PROCEDURE — G0463 HOSPITAL OUTPT CLINIC VISIT: HCPCS | Mod: ZF

## 2019-11-21 RX ORDER — ATORVASTATIN CALCIUM 80 MG/1
80 TABLET, FILM COATED ORAL
COMMUNITY
Start: 2019-10-17

## 2019-11-21 RX ORDER — DONEPEZIL HYDROCHLORIDE 5 MG/1
5 TABLET, FILM COATED ORAL
COMMUNITY
Start: 2019-10-17

## 2019-11-21 RX ORDER — POTASSIUM CHLORIDE 750 MG/1
10 TABLET, EXTENDED RELEASE ORAL 2 TIMES DAILY
COMMUNITY

## 2019-11-21 ASSESSMENT — CONF VISUAL FIELD
OD_NORMAL: 1
METHOD: COUNTING FINGERS
OS_INFERIOR_NASAL_RESTRICTION: 3

## 2019-11-21 ASSESSMENT — CUP TO DISC RATIO
OS_RATIO: 0.9
OD_RATIO: 0.6

## 2019-11-21 ASSESSMENT — REFRACTION_WEARINGRX
OS_SPHERE: -0.75
OD_AXIS: 140
OS_AXIS: 000
OS_CYLINDER: +0.00
OD_ADD: +2.50
OD_CYLINDER: +0.25
OD_SPHERE: -1.00
OS_ADD: +2.50

## 2019-11-21 ASSESSMENT — EXTERNAL EXAM - LEFT EYE: OS_EXAM: NORMAL

## 2019-11-21 ASSESSMENT — VISUAL ACUITY
CORRECTION_TYPE: GLASSES
OS_CC: 20/125
OS_PH_CC+: -2
OD_CC+: -2
OS_PH_CC: 20/60
METHOD: SNELLEN - LINEAR
OD_CC: 20/30

## 2019-11-21 ASSESSMENT — TONOMETRY
OD_IOP_MMHG: 14
IOP_METHOD: APPLANATION
OS_IOP_MMHG: 17
OS_IOP_MMHG: 16
OD_IOP_MMHG: 15
IOP_METHOD: APPLANATION

## 2019-11-21 ASSESSMENT — SLIT LAMP EXAM - LIDS
COMMENTS: NORMAL
COMMENTS: NORMAL

## 2019-11-21 ASSESSMENT — EXTERNAL EXAM - RIGHT EYE: OD_EXAM: NORMAL

## 2019-11-21 NOTE — PATIENT INSTRUCTIONS
Patient will continue on Azopt (brinzolamide) which is an orange top drop 2x/day (12 hours apart) in BOTH EYES, Timolol which is a yellow top drop in the morning in BOTH EYES, Alphagan (Brimonidine) which is a purple top drop 2x/day (12 hours apart) in BOTH EYES, Lumigan which is a teal top drop at bedtime in BOTH EYES.   Patient will return to clinic in 4-6 months with visual field test (OD:24-2 and OS:LVC) and OCT with RNFL analysis.  Patient will also follow up with Dr. Dee.

## 2019-11-21 NOTE — PROGRESS NOTES
1)POAG -- s/p Tube revision (recovering and repositioning) OS (3/21/18), s/p Tube revision OS in 2017 by Dr. Vizcaino, s/p Tube OS in 2013 by Dr. 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 Dee  -- likely etiology of vision loss  5)H/O CVA in 2019 -- underwent TPA    Md:pt prefers to use gtts in BOTH EYES for convenience    Will need to obtain old notes, op notes, and visual field tests.  Patient will continue on Azopt (brinzolamide) which is an orange top drop 2x/day (12 hours apart) in BOTH EYES, Timolol which is a yellow top drop in the morning in BOTH EYES, Alphagan (Brimonidine) which is a purple top drop 2x/day (12 hours apart) in BOTH EYES, Lumigan which is a teal top drop at bedtime in BOTH EYES.   Patient will return to clinic in 4-6 months with visual field test (OD:24-2 and OS:LVC) and OCT with RNFL analysis.  Patient will also follow up with Dr. Dee.     Resident Note (for educational purposes, see above for Assessment and Plan):  Vision stable  IOP controlled  VF stable  Continue current drop regimen  Return 4-6 m LCV left eye, 24-2 right eye, IOP check    Dayami Mccann MD  Ophthalmology Resident, PGY-3          Attending Physician Attestation:  Complete documentation of historical and exam elements from today's encounter can be found in the full encounter summary report (not reduplicated in this progress note). I personally obtained  the chief complaint(s) and history of present illness.  I confirmed and edited as necessary the review of systems, past medical/surgical history, family history, social history, and examination findings as documented by others; and I examined the patient myself. I personally reviewed the relevant tests, images, and reports as documented above. I formulated and edited as necessary the assessment and plan and discussed the findings and management plan with the patient and family.  - Lu Ray MD

## 2019-11-21 NOTE — NURSING NOTE
Chief Complaints and History of Present Illnesses   Patient presents with     Follow Up     Primary open angle glaucoma (POAG) of both eyes, severe stage      Chief Complaint(s) and History of Present Illness(es)     Follow Up     Associated symptoms: Negative for eye pain, dryness, floaters, flashes and tearing    Comments: Primary open angle glaucoma (POAG) of both eyes, severe stage               Comments     Pt states vision is worse since last visit.  Pt has had back fused and a stroke 2 months ago.  Pt has no pain or other concerns.    KUNAL Garcia November 21, 2019 12:31 PM

## 2020-06-10 ENCOUNTER — OFFICE VISIT (OUTPATIENT)
Dept: OPHTHALMOLOGY | Facility: CLINIC | Age: 85
End: 2020-06-10
Attending: OPHTHALMOLOGY
Payer: COMMERCIAL

## 2020-06-10 DIAGNOSIS — Z96.1 PSEUDOPHAKIA OF BOTH EYES: ICD-10-CM

## 2020-06-10 DIAGNOSIS — H40.1133 PRIMARY OPEN ANGLE GLAUCOMA (POAG) OF BOTH EYES, SEVERE STAGE: Primary | ICD-10-CM

## 2020-06-10 DIAGNOSIS — H35.30 AMD (AGE-RELATED MACULAR DEGENERATION), BILATERAL: ICD-10-CM

## 2020-06-10 PROCEDURE — 92133 CPTRZD OPH DX IMG PST SGM ON: CPT | Mod: ZF | Performed by: OPHTHALMOLOGY

## 2020-06-10 PROCEDURE — 92015 DETERMINE REFRACTIVE STATE: CPT | Mod: ZF

## 2020-06-10 PROCEDURE — G0463 HOSPITAL OUTPT CLINIC VISIT: HCPCS | Mod: ZF

## 2020-06-10 PROCEDURE — 92083 EXTENDED VISUAL FIELD XM: CPT | Mod: ZF | Performed by: OPHTHALMOLOGY

## 2020-06-10 RX ORDER — ACETAMINOPHEN 325 MG/1
650 TABLET ORAL
COMMUNITY

## 2020-06-10 ASSESSMENT — REFRACTION_WEARINGRX
OD_CYLINDER: +0.25
OS_CYLINDER: +0.00
OS_AXIS: 000
OD_AXIS: 140
OD_ADD: +2.50
OS_SPHERE: -0.75
OS_ADD: +2.50
OD_SPHERE: -1.00

## 2020-06-10 ASSESSMENT — TONOMETRY
OD_IOP_MMHG: 22
IOP_METHOD: APPLANATION
OS_IOP_MMHG: 23

## 2020-06-10 ASSESSMENT — VISUAL ACUITY
CORRECTION_TYPE: GLASSES
OS_CC: 20/100
METHOD: SNELLEN - LINEAR
OD_CC: 20/40
OD_CC+: -1

## 2020-06-10 ASSESSMENT — REFRACTION_MANIFEST
OD_CYLINDER: +0.50
OD_AXIS: 165
OS_CYLINDER: SPHERE
OS_ADD: +2.50
OD_ADD: +2.50
OS_SPHERE: -0.75
OD_SPHERE: -2.00

## 2020-06-10 ASSESSMENT — EXTERNAL EXAM - LEFT EYE: OS_EXAM: NORMAL

## 2020-06-10 ASSESSMENT — EXTERNAL EXAM - RIGHT EYE: OD_EXAM: NORMAL

## 2020-06-10 ASSESSMENT — SLIT LAMP EXAM - LIDS
COMMENTS: NORMAL
COMMENTS: NORMAL

## 2020-06-10 ASSESSMENT — CONF VISUAL FIELD
OD_NORMAL: 1
OS_NORMAL: 1
METHOD: COUNTING FINGERS

## 2020-06-10 NOTE — NURSING NOTE
Chief Complaints and History of Present Illnesses   Patient presents with     Glaucoma Follow-Up     Chief Complaint(s) and History of Present Illness(es)     Glaucoma Follow-Up     Laterality: both eyes    Quality: Feels that the va is little less    Associated symptoms: Negative for floaters, flashes, redness, dryness and tearing    Treatment side effects: none    Compliance with Treatment: always    Pain scale: 0/10              Comments     Kaitlin SYED 12:20 PM Mallory 10, 2020

## 2020-06-10 NOTE — PATIENT INSTRUCTIONS
Patient will continue on Azopt (brinzolamide) which is an orange top drop 2x/day (12 hours apart) in BOTH EYES, Timolol which is a yellow top drop in the morning in BOTH EYES, Alphagan (Brimonidine) which is a purple top drop 2x/day (12 hours apart) in BOTH EYES, Lumigan which is a teal top drop at bedtime in BOTH EYES.   Patient will return to clinic in 4-6 months with visual field test (OD:24-2 and OS:LVC) and dilated eye exam.

## 2020-06-10 NOTE — PROGRESS NOTES
1)POAG -- s/p Tube revision (recovering and repositioning) OS (3/21/18), s/p Tube revision OS in 2017 by Dr. Vizcaino, s/p Tube OS in 2013 by Dr. 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 Dee  -- likely etiology of vision loss  5)H/O CVA in 2019 -- underwent TPA    Md:pt prefers to use gtts in BOTH EYES for convenience    Patient will continue on Azopt (brinzolamide) which is an orange top drop 2x/day (12 hours apart) in BOTH EYES, Timolol which is a yellow top drop in the morning in BOTH EYES, Alphagan (Brimonidine) which is a purple top drop 2x/day (12 hours apart) in BOTH EYES, Lumigan which is a teal top drop at bedtime in BOTH EYES.   Patient will return to clinic in 3-4 months with visual field test (OD:24-2 and OS:LVC) and dilated eye exam.  Patient will also follow up with Dr. Dee.             Attending Physician Attestation:  Complete documentation of historical and exam elements from today's encounter can be found in the full encounter summary report (not reduplicated in this progress note). I personally obtained the chief complaint(s) and history of present illness.  I confirmed and edited as necessary the review of systems, past medical/surgical history, family history, social history, and examination findings as documented by others; and I examined the patient myself. I personally reviewed the relevant tests, images, and reports as  documented above. I formulated and edited as necessary the assessment and plan and discussed the findings and management plan with the patient and family.  - Lu Ray MD

## 2021-05-26 ENCOUNTER — TRANSFERRED RECORDS (OUTPATIENT)
Dept: HEALTH INFORMATION MANAGEMENT | Facility: CLINIC | Age: 86
End: 2021-05-26

## (undated) DEVICE — SU ETHILON 10-0 CS160-6 12" 9000G

## (undated) DEVICE — EYE NDL RETROBULBAR 25GA 60-111637

## (undated) DEVICE — NDL 23GA 1" 305145

## (undated) DEVICE — EYE TIP BIPOLAR 18GA ERASER 221250

## (undated) DEVICE — ESU CORD BIPOLAR GREEN 10-4000

## (undated) DEVICE — LINEN TOWEL PACK X5 5464

## (undated) DEVICE — GLOVE PROTEXIS MICRO 7.5  2D73PM75

## (undated) DEVICE — PACK CATARACT CUSTOM ASC SEY15CPUMC

## (undated) DEVICE — EYE DRSG PAD OVAL

## (undated) DEVICE — EYE SPONGE SPEAR WECK CEL 0008685

## (undated) DEVICE — TAPE MICROPORE 1"X1.5YD 1530S-1

## (undated) DEVICE — SU ETHILON 8-0 TG175-8 12" 1716G

## (undated) DEVICE — SU VICRYL 7-0 TG140-8DA 18" J546G

## (undated) DEVICE — EYE NDL RETROBULBAR ATKINSON 25GA 1.5" 581637

## (undated) DEVICE — EYE PREP BETADINE 5% SOLUTION 30ML 0065-0411-30

## (undated) DEVICE — EYE SHIELD PLASTIC

## (undated) DEVICE — NDL 30GA 0.5" 305106

## (undated) DEVICE — EYE KNIFE STILETTO VISITEC 1.1MM ANG 45DEG SIDEPORT 376620

## (undated) DEVICE — SOL WATER 10ML VIAL 6332318510

## (undated) DEVICE — PEN MARKING SKIN VISIMARK 1424SR

## (undated) RX ORDER — ACETAMINOPHEN 325 MG/1
TABLET ORAL
Status: DISPENSED
Start: 2018-03-21